# Patient Record
Sex: MALE | Race: WHITE | Employment: OTHER | URBAN - METROPOLITAN AREA
[De-identification: names, ages, dates, MRNs, and addresses within clinical notes are randomized per-mention and may not be internally consistent; named-entity substitution may affect disease eponyms.]

---

## 2017-08-22 ENCOUNTER — OFFICE VISIT (OUTPATIENT)
Dept: NEUROLOGY | Age: 77
End: 2017-08-22

## 2017-08-22 VITALS
HEART RATE: 81 BPM | WEIGHT: 192 LBS | SYSTOLIC BLOOD PRESSURE: 120 MMHG | OXYGEN SATURATION: 95 % | HEIGHT: 72 IN | DIASTOLIC BLOOD PRESSURE: 62 MMHG | BODY MASS INDEX: 26.01 KG/M2

## 2017-08-22 DIAGNOSIS — E56.0 VITAMIN E DEFICIENCY: ICD-10-CM

## 2017-08-22 DIAGNOSIS — M54.16 LUMBAR RADICULOPATHY: Primary | ICD-10-CM

## 2017-08-22 DIAGNOSIS — E11.42 DIABETIC POLYNEUROPATHY ASSOCIATED WITH TYPE 2 DIABETES MELLITUS (HCC): ICD-10-CM

## 2017-08-22 DIAGNOSIS — E55.9 VITAMIN D DEFICIENCY: ICD-10-CM

## 2017-08-22 DIAGNOSIS — E53.8 VITAMIN B12 DEFICIENCY: ICD-10-CM

## 2017-08-22 DIAGNOSIS — D47.2 MONOCLONAL GAMMOPATHY: ICD-10-CM

## 2017-08-22 DIAGNOSIS — R29.898 WEAKNESS OF RIGHT LEG: ICD-10-CM

## 2017-08-22 DIAGNOSIS — E53.8 B12 DEFICIENCY: ICD-10-CM

## 2017-08-22 RX ORDER — DESMOPRESSIN ACETATE 0.1 MG/ML
SOLUTION NASAL
Refills: 0 | COMMUNITY
Start: 2017-06-27 | End: 2022-07-12 | Stop reason: ALTCHOICE

## 2017-08-22 NOTE — PROGRESS NOTES
NEUROLOGY HISTORY AND PHYSICAL    Name Raisa Host Age 78 y.o. MRN [id number]  1940       Chief Complaint:  Buckling right leg   This is a 78 y.o. right leg is wobbly compared to the left. This started a few years ago worse in the last year. He may feel that it collapse at times. His knees buckle. It is not painful. He walks about 30 minutes a day. He has been orthopedics and he had x-rays but does not recall the results. He has been through for weeks of physical therapy and did not note improvement. He does not note numbness. Assessment and Plan   1. 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 B12 deficiency  Vitamin b12    3. Weakness of right leg  Hip flexor weakness    4. Diabetes II  Diet controlled    5. Hypertension  Continue valsartan    6. Diabetic neuropathy  EMG    6. Vitamin b12  deficiency      Patient Allergies    Review of patient's allergies indicates no known allergies. Current Outpatient Prescriptions   Medication Sig    desmopressin (DDAVP NASAL) 10 mcg/spray (0.1 mL) solution USE 1 SPRAY AS DIRECTED AT BEDTIME (NEED TO BE SEEN)    valsartan-hydrochlorothiazide (DIOVAN-HCT) 160-12.5 mg per tablet Take 1 tablet by mouth daily.  atorvastatin (LIPITOR) 40 mg tablet Take 40 mg by mouth daily. No current facility-administered medications for this visit. Past Medical History:   Diagnosis Date    Diabetes ([facility] [address]     Hypertension     Ill-defined condition     high cholesterol       Social History   Substance Use Topics    Smoking status: Never Smoker    Smokeless tobacco: Never Used    Alcohol use Yes      Comment: 1 etoh drink/day       Family History   Problem Relation Age of Onset    Mental Retardation Daughter      Review of Systems   Constitutional: Negative for chills and fever. HENT: Negative for ear pain. Eyes: Negative for pain and discharge. Respiratory: Negative for cough and hemoptysis.     Cardiovascular: Negative for chest pain and claudication. Gastrointestinal: Negative for constipation and diarrhea. Genitourinary: Negative for flank pain and hematuria. Musculoskeletal: Positive for back pain and myalgias. Skin: Negative for itching and rash. Neurological: Positive for sensory change and focal weakness. Negative for headaches. Endo/Heme/Allergies: Negative for environmental allergies. Does not bruise/bleed easily. Psychiatric/Behavioral: Negative for depression and hallucinations. Visit Vitals    /62    Pulse 81    Ht 6' (1.829 m)    Wt 192 lb (87.1 kg)    SpO2 95%    BMI 26.04 kg/m2      General: Well developed, well nourished. Patient in no apparent distress   Head: Normocephalic, atraumatic, anicteric sclera   Neck Normal ROM, No thyromegally   Lungs:  Clear to auscultation bilaterally, No wheezes or rubs   Cardiac: Regular rate and rhythm with no murmurs. Abd: Bowel sounds were audible. No tenderness on palpation   Ext: No pedal edema   Skin: No overt signs of rash or insect bites     NeurologicExam:  Mental Status: Alert and oriented to person place and time   Speech: Fluent no aphasia or dysarthria. Cranial Nerves:  II - XII Inact   Motor:  Right iliopsoas weakness. And symmetric weakness of small muscles of the feet. Reflexes:   +1/4 over the proximal tendons of the upper and lower extremities. +0/4 over distal tendons. Sensory:   Symmetric distal reduction in sensory perception affecting all modalities   Gait:  Wide based    Tremor:   No tremor noted. Cerebellar:  Coordination intact. Neurovascular: No carotid bruits.  No JVD       Imaging  MRI of the lumbar spine shows multilevel degenerative disc disease with bulging and mild to moderate bilateral foraminal compromise L3/4 and significant left L5     Lab Review  Lab Results   Component Value Date/Time    WBC 6.4 07/01/2009 12:25 PM    HCT 43.4 07/01/2009 12:25 PM    HGB 15.0 07/01/2009 12:25 PM    PLATELET 019 36/72/9816 12:25 PM Lab Results   Component Value Date/Time    Sodium 138 07/01/2009 12:25 PM    Potassium 3.8 07/01/2009 12:25 PM    Chloride 103 07/01/2009 12:25 PM    CO2 29 07/01/2009 12:25 PM    Glucose 99 07/01/2009 12:25 PM    BUN 15 07/01/2009 12:25 PM    Creatinine 1.0 07/01/2009 12:25 PM    Calcium 8.7 07/01/2009 12:25 PM       60  minutes spent more than 50% spent in face to face  examination and discussion of treatment options and approach

## 2017-08-22 NOTE — PATIENT INSTRUCTIONS
[facility]   Statement to Patients  April 1, 2014      In an effort to ensure the large volume of patient prescription refills is processed in the most efficient and expeditious manner, we are asking our patients to assist us by calling your Pharmacy for all prescription refills, this will include also your  Mail Order Pharmacy. The pharmacy will contact our office electronically to continue the refill process. Please do not wait until the last minute to call your pharmacy. We need at least 48 hours (2days) to fill prescriptions. We also encourage you to call your pharmacy before going to  your prescription to make sure it is ready. With regard to controlled substance prescription refill requests (narcotic refills) that need to be picked up at our office, we ask your cooperation by providing us with at least 72 hours (3days) notice that you will need a refill. We will not refill narcotic prescription refill requests after 4:00pm on any weekday, Monday through Thursday, or after 2:00pm on Fridays, or on the weekends. We encourage everyone to explore another way of getting your prescription refill request processed using [employer], our patient web portal through our electronic medical record system. [employer] is an efficient and effective way to communicate your medication request directly to the office and  downloadable as an marzena on your smart phone . [employer] also features a review functionality that allows you to view your medication list as well as leave messages for your physician. Are you ready to get connected? If so please review the attatched instructions or speak to any of our staff to get you set up right away! Thank you so much for your cooperation. Should you have any questions please contact our Practice Administrator.     The Physicians and Staff,  [facility]          A Healthy Lifestyle: Care Instructions  Your Care Instructions  A healthy lifestyle can help you feel good, stay at a healthy weight, and have plenty of energy for both work and play. A healthy lifestyle is something you can share with your whole family. A healthy lifestyle also can lower your risk for serious health problems, such as high blood pressure, heart disease, and diabetes. You can follow a few steps listed below to improve your health and the health of your family. Follow-up care is a key part of your treatment and safety. Be sure to make and go to all appointments, and call your doctor if you are having problems. Its also a good idea to know your test results and keep a list of the medicines you take. How can you care for yourself at home? · Do not eat too much sugar, fat, or fast foods. You can still have dessert and treats now and then. The goal is moderation. · Start small to improve your eating habits. Pay attention to portion sizes, drink less juice and soda pop, and eat more fruits and vegetables. ¨ Eat a healthy amount of food. A 3-ounce serving of meat, for example, is about the size of a deck of cards. Fill the rest of your plate with vegetables and whole grains. ¨ Limit the amount of soda and sports drinks you have every day. Drink more water when you are thirsty. ¨ Eat at least 5 servings of fruits and vegetables every day. It may seem like a lot, but it is not hard to reach this goal. A serving or helping is 1 piece of fruit, 1 cup of vegetables, or 2 cups of leafy, raw vegetables. Have an apple or some carrot sticks as an afternoon snack instead of a candy bar. Try to have fruits and/or vegetables at every meal.  · Make exercise part of your daily routine. You may want to start with simple activities, such as walking, bicycling, or slow swimming. Try to be active 30 to 60 minutes every day. You do not need to do all 30 to 60 minutes all at once. For example, you can exercise 3 times a day for 10 or 20 minutes.  Moderate exercise is safe for most people, but it is always a good idea to talk to your doctor before starting an exercise program.  · Keep moving. Nancy Gutierrez the lawn, work in the garden, or [employer]. Take the stairs instead of the elevator at work. · If you smoke, quit. People who smoke have an increased risk for heart attack, stroke, cancer, and other lung illnesses. Quitting is hard, but there are ways to boost your chance of quitting tobacco for good. ¨ Use nicotine gum, patches, or lozenges. ¨ Ask your doctor about stop-smoking programs and medicines. ¨ Keep trying. In addition to reducing your risk of diseases in the future, you will notice some benefits soon after you stop using tobacco. If you have shortness of breath or asthma symptoms, they will likely get better within a few weeks after you quit. · Limit how much alcohol you drink. Moderate amounts of alcohol (up to 2 drinks a day for men, 1 drink a day for women) are okay. But drinking too much can lead to liver problems, high blood pressure, and other health problems. Family health  If you have a family, there are many things you can do together to improve your health. · Eat meals together as a family as often as possible. · Eat healthy foods. This includes fruits, vegetables, lean meats and dairy, and whole grains. · Include your family in your fitness plan. Most people think of activities such as jogging or tennis as the way to fitness, but there are many ways you and your family can be more active. Anything that makes you breathe hard and gets your heart pumping is exercise. Here are some tips:  ¨ Walk to do errands or to take your child to school or the bus. ¨ Go for a family bike ride after dinner instead of watching TV. Where can you learn more? Go to http://hilario-toy.info/. Enter N631 in the search box to learn more about \"A Healthy Lifestyle: Care Instructions. \"  Current as of: July 26, 2016  Content Version: 11.3  © 4350-5856 [employer]. Care instructions adapted under license by [employer] (which disclaims liability or warranty for this information). If you have questions about a medical condition or this instruction, always ask your healthcare professional. [address] any warranty or liability for your use of this information.

## 2017-08-22 NOTE — MR AVS SNAPSHOT
Visit Information Date & Time Provider Department Dept. Phone Encounter #  
 8/22/2017  9:00 AM Odilia Khan MD Neurology Clinic at [facility] [phone] [id number] Your Appointments 9/14/2017  3:00 PM  
PROCEDURE with Osvaldo Salter MD  
Neurology Clinic at [facility]) Appt Note: emg lower extremity,lsw,08/22/2017,  
 [address], 
[address] [place] [address], [address] [address]  
[address], [address] [facility], [address] [address] Upcoming Health Maintenance Date Due DTaP/Tdap/Td series (1 - Tdap) 11/4/1963 FOBT Q 1 YEAR AGE 50-75 11/4/1992 ZOSTER VACCINE AGE 60> 9/4/2002 GLAUCOMA SCREENING Q2Y 11/4/2007 Pneumococcal 65+ Low/Medium Risk (1 of 2 - PCV13) 11/4/2007 MEDICARE YEARLY EXAM 11/4/2007 INFLUENZA AGE 9 TO ADULT 8/1/2017 Allergies as of 8/22/2017  Review Complete On: 8/22/2017 By: Clarissa Roland No Known Allergies Current Immunizations  Never Reviewed No immunizations on file. Not reviewed this visit You Were Diagnosed With   
  
 Codes Comments Lumbar radiculopathy    -  Primary ICD-10-CM: M54.16 
ICD-9-CM: 724.4 B12 deficiency     ICD-10-CM: E53.8 ICD-9-CM: 266.2 Weakness of right leg     ICD-10-CM: R29.898 ICD-9-CM: 729.89 Diabetic polyneuropathy associated with type 2 diabetes mellitus [facility]     ICD-10-CM: E11.42 
ICD-9-CM: 250.60, 357.2 Vitamin E deficiency     ICD-10-CM: E56.0 ICD-9-CM: 269.1 Vitamin B12 deficiency     ICD-10-CM: E53.8 ICD-9-CM: 266.2 Vitamin D deficiency     ICD-10-CM: E55.9 ICD-9-CM: 268.9 Monoclonal gammopathy     ICD-10-CM: D47.2 ICD-9-CM: 273.1 Vitals BP Pulse Height(growth percentile) Weight(growth percentile) SpO2 BMI  
 120/62 81 6' (1.829 m) 192 lb (87.1 kg) 95% 26.04 kg/m2 Smoking Status Never Smoker Vitals History BMI and BSA Data Body Mass Index Body Surface Area 26.04 kg/m 2 2.1 m 2 Preferred Pharmacy Pharmacy Name Phone [id number], [address] [phone] Your Updated Medication List  
  
   
This list is accurate as of: 8/22/17  9:39 AM.  Always use your most recent med list.  
  
  
  
  
 atorvastatin 40 mg tablet Commonly known as:  LIPITOR Take 40 mg by mouth daily. desmopressin 10 mcg/spray (0.1 mL) solution Commonly known as:  DDAVP NASAL  
USE 1 SPRAY AS DIRECTED AT BEDTIME (NEED TO BE SEEN)  
  
 valsartan-hydroCHLOROthiazide 160-12.5 mg per tablet Commonly known as:  DIOVAN-HCT Take 1 tablet by mouth daily. We Performed the Following SERUM PROT ELECTROPHORESIS IFX [[id number] Custom] VITAMIN B12 & FOLATE [46368 CPT(R)] VITAMIN B12 & FOLATE [29552 CPT(R)] VITAMIN B6 [id number] CPT(R)] VITAMIN D, 25 HYDROXY [id number] CPT(R)] VITAMIN E [id number] CPT(R)] To-Do List   
 08/22/2017 Neurology:  NCV WITH EMG BILATERAL LOWER EXTREMITIES Patient Instructions PRESCRIPTION REFILL POLICY [facility] Statement to Patients April 1, 2014 In an effort to ensure the large volume of patient prescription refills is processed in the most efficient and expeditious manner, we are asking our patients to assist us by calling your Pharmacy for all prescription refills, this will include also your  Mail Order Pharmacy. The pharmacy will contact our office electronically to continue the refill process. Please do not wait until the last minute to call your pharmacy. We need at least 48 hours (2days) to fill prescriptions. We also encourage you to call your pharmacy before going to  your prescription to make sure it is ready.   
 
With regard to controlled substance prescription refill requests (narcotic refills) that need to be picked up at our office, we ask your cooperation by providing us with at least 72 hours (3days) notice that you will need a refill. We will not refill narcotic prescription refill requests after 4:00pm on any weekday, Monday through Thursday, or after 2:00pm on Fridays, or on the weekends. We encourage everyone to explore another way of getting your prescription refill request processed using [employer], our patient web portal through our electronic medical record system. [employer] is an efficient and effective way to communicate your medication request directly to the office and  downloadable as an marzena on your smart phone . [employer] also features a review functionality that allows you to view your medication list as well as leave messages for your physician. Are you ready to get connected? If so please review the attatched instructions or speak to any of our staff to get you set up right away! Thank you so much for your cooperation. Should you have any questions please contact our Practice Administrator. The Physicians and Staff,  [facility] A Healthy Lifestyle: Care Instructions Your Care Instructions A healthy lifestyle can help you feel good, stay at a healthy weight, and have plenty of energy for both work and play. A healthy lifestyle is something you can share with your whole family. A healthy lifestyle also can lower your risk for serious health problems, such as high blood pressure, heart disease, and diabetes. You can follow a few steps listed below to improve your health and the health of your family. Follow-up care is a key part of your treatment and safety. Be sure to make and go to all appointments, and call your doctor if you are having problems. Its also a good idea to know your test results and keep a list of the medicines you take. How can you care for yourself at home? · Do not eat too much sugar, fat, or fast foods. You can still have dessert and treats now and then. The goal is moderation. · Start small to improve your eating habits. Pay attention to portion sizes, drink less juice and soda pop, and eat more fruits and vegetables. ¨ Eat a healthy amount of food. A 3-ounce serving of meat, for example, is about the size of a deck of cards. Fill the rest of your plate with vegetables and whole grains. ¨ Limit the amount of soda and sports drinks you have every day. Drink more water when you are thirsty. ¨ Eat at least 5 servings of fruits and vegetables every day. It may seem like a lot, but it is not hard to reach this goal. A serving or helping is 1 piece of fruit, 1 cup of vegetables, or 2 cups of leafy, raw vegetables. Have an apple or some carrot sticks as an afternoon snack instead of a candy bar. Try to have fruits and/or vegetables at every meal. 
· Make exercise part of your daily routine. You may want to start with simple activities, such as walking, bicycling, or slow swimming. Try to be active 30 to 60 minutes every day. You do not need to do all 30 to 60 minutes all at once. For example, you can exercise 3 times a day for 10 or 20 minutes. Moderate exercise is safe for most people, but it is always a good idea to talk to your doctor before starting an exercise program. 
· Keep moving. Loretta Pour the lawn, work in the garden, or [employer]. Take the stairs instead of the elevator at work. · If you smoke, quit. People who smoke have an increased risk for heart attack, stroke, cancer, and other lung illnesses. Quitting is hard, but there are ways to boost your chance of quitting tobacco for good. ¨ Use nicotine gum, patches, or lozenges. ¨ Ask your doctor about stop-smoking programs and medicines. ¨ Keep trying.  
In addition to reducing your risk of diseases in the future, you will notice some benefits soon after you stop using tobacco. If you have shortness of breath or asthma symptoms, they will likely get better within a few weeks after you quit. · Limit how much alcohol you drink. Moderate amounts of alcohol (up to 2 drinks a day for men, 1 drink a day for women) are okay. But drinking too much can lead to liver problems, high blood pressure, and other health problems. Family health If you have a family, there are many things you can do together to improve your health. · Eat meals together as a family as often as possible. · Eat healthy foods. This includes fruits, vegetables, lean meats and dairy, and whole grains. · Include your family in your fitness plan. Most people think of activities such as jogging or tennis as the way to fitness, but there are many ways you and your family can be more active. Anything that makes you breathe hard and gets your heart pumping is exercise. Here are some tips: 
¨ Walk to do errands or to take your child to school or the bus. ¨ Go for a family bike ride after dinner instead of watching TV. Where can you learn more? Go to http://hilario-toy.info/. Enter Z021 in the search box to learn more about \"A Healthy Lifestyle: Care Instructions. \" Current as of: July 26, 2016 Content Version: 11.3 © 7730-1234 [employer]. Care instructions adapted under license by [employer] (which disclaims liability or warranty for this information). If you have questions about a medical condition or this instruction, always ask your healthcare professional. [place] [address] any warranty or liability for your use of this information. Introducing [facility]! [facility] introduces Marco Vasco patient portal. Now you can access parts of your medical record, email your doctor's office, and request medication refills online. 1. In your internet browser, go to [email or web address] 2. Click on the First Time User? Click Here link in the Sign In box. You will see the New Member Sign Up page. 3. Enter your [employer] Access Code exactly as it appears below. You will not need to use this code after youve completed the sign-up process. If you do not sign up before the expiration date, you must request a new code. · [employer] Access Code: [email or web address] Expires: 11/20/2017  8:31 AM 
 
4. Enter the last four digits of your Social Security Number (xxxx) and Date of Birth (mm/dd/yyyy) as indicated and click Submit. You will be taken to the next sign-up page. 5. Create a [employer] ID. This will be your [employer] login ID and cannot be changed, so think of one that is secure and easy to remember. 6. Create a [employer] password. You can change your password at any time. 7. Enter your Password Reset Question and Answer. This can be used at a later time if you forget your password. 8. Enter your e-mail address. You will receive e-mail notification when new information is available in [address] 9. Click Sign Up. You can now view and download portions of your medical record. 10. Click the Download Summary menu link to download a portable copy of your medical information. If you have questions, please visit the Frequently Asked Questions section of the [employer] website. Remember, [employer] is NOT to be used for urgent needs. For medical emergencies, dial 911. Now available from your iPhone and Android! Please provide this summary of care documentation to your next provider. Your primary care clinician is listed as NOT ON FILE. If you have any questions after today's visit, please call [phone].

## 2017-09-14 ENCOUNTER — OFFICE VISIT (OUTPATIENT)
Dept: NEUROLOGY | Age: 77
End: 2017-09-14

## 2017-09-14 ENCOUNTER — TELEPHONE (OUTPATIENT)
Dept: NEUROLOGY | Age: 77
End: 2017-09-14

## 2017-09-14 VITALS — DIASTOLIC BLOOD PRESSURE: 67 MMHG | OXYGEN SATURATION: 100 % | HEART RATE: 100 BPM | SYSTOLIC BLOOD PRESSURE: 101 MMHG

## 2017-09-14 DIAGNOSIS — E11.42 DIABETIC POLYNEUROPATHY ASSOCIATED WITH TYPE 2 DIABETES MELLITUS (HCC): Primary | ICD-10-CM

## 2017-09-14 DIAGNOSIS — G56.00 CARPAL TUNNEL SYNDROME, UNSPECIFIED LATERALITY: ICD-10-CM

## 2017-09-14 NOTE — PROCEDURES
[facility] at [address] [facility] [address] [facility], [address] [facility]  Tel [phone]     Fax [phone]  Electrodiagnostic Study Report  Test Date:  2017    Patient: Tevin Solorzano  Age: 78   [facility]#: [id number]  Ref Phys: Emily Abreu   Sex: Male  Physician: Yamilex Schuster MD   Height: ' \"      : 1940  DOS: 2017       CHIEF COMPLAINTS:  Patient is a 78year-old male who presents with right leg weakness query right lumbar radiculopathy and peripheral neuropathy.     EMG & NCV Findings:    Nerve Conduction Studies  Anti Sensory Summary Table     Site NR Peak (ms) P-T Amp (µV) Site1 Site2 Dist (cm)   Right Radial Anti Sensory (Base 1st Digit)  33.8°C   Wrist    2.5 16.4 Wrist Base 1st Digit 10.0   Left Sup Fibular Anti Sensory (Lat ankle)  31.7°C   Lower leg    3.2 2.9 Lower leg Lat ankle 10.0   Right Sup Fibular Anti Sensory (Lat ankle)  31.5°C   Lower leg    3.5 4.1 Lower leg Lat ankle 10.0   Left Sural Anti Sensory (Lat Mall)  30.8°C   Calf    4.3 3.1 Calf Lat Mall 14.0   Site 2    4.3 3.9      Right Sural Anti Sensory (Lat Mall)  31.8°C   Calf    3.9 4.9 Calf Lat Mall 14.0   Site 2    3.9 7.9      Site 3    3.5 4.7        Motor Summary Table     Site NR Onset (ms) O-P Amp (mV) P-T Amp (mV) Site1 Site2 Dist (cm) Francisco J (m/s)   Left Fibular Motor (Ext Dig Brev)  31.4°C   Ankle    5.6 0.2 0.3 Ankle Ext Dig Brev 8.0    B Fib    14.5 0.2 0.1 B Fib Ankle 32.5 37   Poplt    17.1 0.2 0.1 Poplt B Fib 10.0 38   Right Fibular Motor (Ext Dig Brev)  31.8°C   Ankle    5.4 1.9 3.3 Ankle Ext Dig Brev 8.0    B Fib    14.0 1.8 2.7 B Fib Ankle 33.0 38   Poplt    16.4 1.9 2.7 Poplt B Fib 10.0 42   Left Median Motor (Abd Poll Brev)  33.9°C   Wrist    4.2 6.5 10.1 Wrist Abd Poll Brev 8.0    Elbow    9.5 6.4 9.6 Elbow Wrist 24.0 45   Right Median Motor (Abd Poll Brev)  33.8°C   Wrist    4.8 6.0 8.4 Wrist Abd Poll Brev 8.0    Elbow    10.0 5.1 7.1 Elbow Wrist 24.5 47 Left Tibial Motor (Abd Villagomez Brev)  31.7°C   Ankle    4.9 1.5 2.4 Ankle Abd Villagomez Brev 8.0    Knee    17.0 1.7 2.7 Knee Ankle 45.0 37   Right Tibial Motor (Abd Villagomez Brev)  31.9°C   Ankle    5.2 1.8 3.0 Ankle Abd Villagomez Brev 8.0    Knee    16.6 1.6 2.5 Knee Ankle 44.5 39   Right Ulnar Motor (Abd Dig Minimi)  33.9°C   Wrist    3.0 9.9 15.8 Wrist Abd Dig Minimi 8.0    B Elbow    7.0 8.5 14.1 B Elbow Wrist 20.5 51   A Elbow    8.9 9.2 14.9 A Elbow B Elbow 10.0 53     Comparison Summary Table     Site NR Peak (ms) P-T Amp (µV) Site1 Site2 Dist (cm) Delta-0 (ms)   Left Median/Ulnar Palm Comparison (Wrist)  33.6°C   Median Palm    2.4 52.2 Median Palm Ulnar Palm 8.0 0.2   Ulnar Palm    2.2 11.7       Right Median/Ulnar Palm Comparison (Wrist)  34.2°C   Median Palm    2.9 45.0 Median Palm Ulnar Palm 8.0 0.8   Ulnar Palm    2.0 15.7         F Wave Studies     NR F-Lat (ms) L-R F-Lat (ms)   Right Median (Mrkrs) (Abd Poll Brev)  34.3°C      31.86    Right Peroneal (Mrkrs) (EDB)  32°C      58.05    Right Tibial (Mrkrs) (Abd Hallucis)  31.8°C      57.77      EMG     Side Muscle Nerve Root Ins Act Fibs Psw Recrt Duration Amp Poly Comment   Right AntTibialis Dp Br Peron L4-5 Nml Nml Nml Nml Nml Nml Nml    Right MedGastroc Tibial S1-2 Nml Nml Nml Nml Nml Nml Nml    Right VastusLat Femoral L2-4 Nml Nml Nml Nml Nml Nml Nml    Right BicepsFemS Sciatic L5-S1 Nml Nml Nml Nml Nml Nml Nml    Right TensorFascLat SupGluteal L4-5, S1 Nml Nml Nml Nml Nml Nml Nml    Left AntTibialis Dp Br Peron L4-5 Nml Nml Nml Nml Nml Nml Nml    Left MedGastroc Tibial S1-2 Nml Nml Nml Nml Nml Nml Nml    Left VastusLat Femoral L2-4 Nml Nml Nml Nml Nml Nml Nml    Right 1stDorInt Ulnar C8-T1 Nml Nml Nml Nml Nml Nml Nml    Right Biceps Musculocut C5-6 Nml Nml Nml Nml Nml Nml Nml    Right PronatorTeres Median C6-7 Nml Nml Nml Nml Nml Nml Nml    Right Triceps Radial C6-7-8 Nml Nml Nml Nml Nml Nml Nml    Right Deltoid Axillary C5-6 Nml Nml Nml Nml Nml Nml Nml    Right Lower Lumb Parasp Rami L5,S1 Nml Nml Nml Nml Nml Nml Nml        Waveforms:                                           Summary:  Nerve conduction studies of the bilateral lower and bilateral upper extremities were remarkable for low sural to radial sensory amplitude ratio. The bilateral peroneal amplitudes were low. All the motor conduction velocities in the upper and lower extremities were slow. The left peroneal motor amplitude was low in comparison to the right. The bilateral tibial motor amplitudes were low as well. The right median latency was prolonged in comparison to the ulnar latency on palmar comparison studies. Needle electrode examination of the bilateral lower in the right upper extremities was unremarkable. Impression: This is an abnormal study. There is electrophysiological evidence of:  1. A length dependent axonal polyneuropathy; and  2. 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    There was no electrophysiological evidence of a right cervical, right lumbar or a left lumbar radiculopathy.             __________________  Lam Gunderson M.D.

## 2017-09-14 NOTE — TELEPHONE ENCOUNTER
Patient came into the office stating he received a bill from [facility] for his vitamin b6 lab. He states it may be a coding issue and would like to see if it can be resubmitted.  Please advise   [phone]

## 2017-09-14 NOTE — LETTER
[facility] at [address] [place] [address] [address] [place], [address] [facility] Tel [phone]     Fax [phone] Electrodiagnostic Study Report Test Date:  2017 Patient: Archie Moran  Age: 78 [facility]#: [id number]  Ref Phys: Emma Villanueva Sex: Male  Physician: Kalli Rios MD  
Height: ' \"     
: 1940  DOS: 2017 CHIEF COMPLAINTS: 
Patient is a 78year-old male who presents with right leg weakness query right lumbar radiculopathy and peripheral neuropathy. EMG & NCV Findings: 
 
Nerve Conduction Studies Anti Sensory Summary Table Site NR Peak (ms) P-T Amp (µV) Site1 Site2 Dist (cm) Right Radial Anti Sensory (Base 1st Digit)  33.8°C Wrist    2.5 16.4 Wrist Base 1st Digit 10.0 Left Sup Fibular Anti Sensory (Lat ankle)  31.7°C Lower leg    3.2 2.9 Lower leg Lat ankle 10.0 Right Sup Fibular Anti Sensory (Lat ankle)  31.5°C Lower leg    3.5 4.1 Lower leg Lat ankle 10.0 Left Sural Anti Sensory (Lat Mall)  30.8°C Calf    4.3 3.1 Calf Lat Mall 14.0 Site 2    4.3 3.9 Right Sural Anti Sensory (Lat Mall)  31.8°C Calf    3.9 4.9 Calf Lat Mall 14.0 Site 2    3.9 7.9 Site 3    3.5 4.7 Motor Summary Table Site NR Onset (ms) O-P Amp (mV) P-T Amp (mV) Site1 Site2 Dist (cm) Francisco J (m/s) Left Fibular Motor (Ext Dig Brev)  31.4°C Ankle    5.6 0.2 0.3 Ankle Ext Dig Brev 8.0 B Fib    14.5 0.2 0.1 B Fib Ankle 32.5 37 Poplt    17.1 0.2 0.1 Poplt B Fib 10.0 38 Right Fibular Motor (Ext Dig Brev)  31.8°C Ankle    5.4 1.9 3.3 Ankle Ext Dig Brev 8.0 B Fib    14.0 1.8 2.7 B Fib Ankle 33.0 38 Poplt    16.4 1.9 2.7 Poplt B Fib 10.0 42 Left Median Motor (Abd Poll Brev)  33.9°C Wrist    4.2 6.5 10.1 Wrist Abd Poll Brev 8.0 Elbow    9.5 6.4 9.6 Elbow Wrist 24.0 45 Right Median Motor (Abd Poll Brev)  33.8°C Wrist    4.8 6.0 8.4 Wrist Abd Poll Brev 8.0 Elbow    10.0 5.1 7.1 Elbow Wrist 24.5 47 Left Tibial Motor (Abd Villagomez Brev)  31.7°C Ankle    4.9 1.5 2.4 Ankle Abd Villagomez Brev 8.0 Knee    17.0 1.7 2.7 Knee Ankle 45.0 37 Right Tibial Motor (Abd Villagomez Brev)  31.9°C Ankle    5.2 1.8 3.0 Ankle Abd Villagomez Brev 8.0 Knee    16.6 1.6 2.5 Knee Ankle 44.5 39 Right Ulnar Motor (Abd Dig Minimi)  33.9°C Wrist    3.0 9.9 15.8 Wrist Abd Dig Minimi 8.0 B Elbow    7.0 8.5 14.1 B Elbow Wrist 20.5 51 A Elbow    8.9 9.2 14.9 A Elbow B Elbow 10.0 53 Comparison Summary Table Site NR Peak (ms) P-T Amp (µV) Site1 Site2 Dist (cm) Delta-0 (ms) Left Median/Ulnar Palm Comparison (Wrist)  33.6°C Median Palm    2.4 52.2 Median Palm Ulnar Palm 8.0 0.2 Ulnar Palm    2.2 11.7 Right Median/Ulnar Palm Comparison (Wrist)  34.2°C Median Palm    2.9 45.0 Median Palm Ulnar Palm 8.0 0.8 Ulnar Palm    2.0 15.7 F Wave Studies NR F-Lat (ms) L-R F-Lat (ms) Right Median (Mrkrs) (Abd [employer] [place] Brev)  34.3°C  
   31.86 Right Peroneal (Mrkrs) (EDB)  32°C  
   58.05 Right Tibial (Mrkrs) (Abd Hallucis)  31.8°C  
   57.77 EMG Side Muscle Nerve Root Ins Act Fibs Psw Recrt Duration Amp Poly Comment Right AntTibialis Dp Br Peron L4-5 Nml Nml Nml Nml Nml Nml Nml Right MedGastroc Tibial S1-2 Nml Nml Nml Nml Nml Nml Nml Right VastusLat Femoral L2-4 Nml Nml Nml Nml Nml Nml Nml Right BicepsFemS Sciatic L5-S1 Nml Nml Nml Nml Nml Nml Nml Right TensorFascLat SupGluteal L4-5, S1 Nml Nml Nml Nml Nml Nml Nml Left AntTibialis Dp Br Peron L4-5 Nml Nml Nml Nml Nml Nml Nml Left MedGastroc Tibial S1-2 Nml Nml Nml Nml Nml Nml Nml Left VastusLat Femoral L2-4 Nml Nml Nml Nml Nml Nml Nml Right 1stDorInt Ulnar C8-T1 Nml Nml Nml Nml Nml Nml Nml Right Biceps Musculocut C5-6 Nml Nml Nml Nml Nml Nml Nml Right PronatorTeres Median C6-7 Nml Nml Nml Nml Nml Nml Nml Right Triceps Radial C6-7-8 Nml Nml Nml Nml Nml Nml Nml Right Deltoid Axillary C5-6 Nml Nml Nml Nml Nml Nml Nml   
 Right Lower Lumb Parasp Rami L5,S1 Nml Nml Nml Nml Nml Nml Nml Waveforms: 
    
 
    
 
    
 
    
 
    
 
   
 
Summary: 
Nerve conduction studies of the bilateral lower and bilateral upper extremities were remarkable for low sural to radial sensory amplitude ratio. The bilateral peroneal amplitudes were low. All the motor conduction velocities in the upper and lower extremities were slow. The left peroneal motor amplitude was low in comparison to the right. The bilateral tibial motor amplitudes were low as well. The right median latency was prolonged in comparison to the ulnar latency on palmar comparison studies. Needle electrode examination of the bilateral lower in the right upper extremities was unremarkable. Impression: This is an abnormal study. There is electrophysiological evidence of: 1. A length dependent axonal polyneuropathy; and 
2. 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 
 
There was no electrophysiological evidence of a right cervical, right lumbar or a left lumbar radiculopathy. __________________ Da Patrick M.D.

## 2017-09-15 LAB
25(OH)D3+25(OH)D2 SERPL-MCNC: 28.5 NG/ML (ref 30–100)
A-TOCOPHEROL VIT E SERPL-MCNC: 10.7 MG/L (ref 5.3–17.5)
FOLATE SERPL-MCNC: 13.4 NG/ML
VIT B12 SERPL-MCNC: 278 PG/ML (ref 211–946)
VIT B6 SERPL-MCNC: 10.2 UG/L (ref 5.3–46.7)

## 2017-09-15 NOTE — TELEPHONE ENCOUNTER
Patient brought copy of medicare beneficiary notice for hi lab fred coverage   He received a Vitamin B6 lab and letter stated medicare does not  pay for this test.   Nurse called [facility] and gave them  Dx. codes for test for order. Patient was then called and asked to call medicare and find put why this was not paid.

## 2017-09-19 DIAGNOSIS — E55.9 VITAMIN D DEFICIENCY: ICD-10-CM

## 2017-09-19 DIAGNOSIS — E53.8 VITAMIN B12 DEFICIENCY: Primary | ICD-10-CM

## 2017-09-19 RX ORDER — CYANOCOBALAMIN 1000 UG/ML
INJECTION, SOLUTION INTRAMUSCULAR; SUBCUTANEOUS
Qty: 8 VIAL | Refills: 0 | Status: SHIPPED | OUTPATIENT
Start: 2017-09-19 | End: 2021-09-24

## 2017-10-12 ENCOUNTER — OFFICE VISIT (OUTPATIENT)
Dept: NEUROLOGY | Age: 77
End: 2017-10-12

## 2017-10-12 VITALS
OXYGEN SATURATION: 96 % | BODY MASS INDEX: 26.01 KG/M2 | DIASTOLIC BLOOD PRESSURE: 62 MMHG | WEIGHT: 192 LBS | SYSTOLIC BLOOD PRESSURE: 110 MMHG | HEIGHT: 72 IN | HEART RATE: 89 BPM

## 2017-10-12 DIAGNOSIS — R26.81 GAIT INSTABILITY: ICD-10-CM

## 2017-10-12 DIAGNOSIS — E55.9 VITAMIN D DEFICIENCY: ICD-10-CM

## 2017-10-12 DIAGNOSIS — R41.3 MEMORY LOSS: ICD-10-CM

## 2017-10-12 DIAGNOSIS — G91.2 NORMAL PRESSURE HYDROCEPHALUS (HCC): Primary | ICD-10-CM

## 2017-10-12 DIAGNOSIS — E53.8 B12 DEFICIENCY: ICD-10-CM

## 2017-10-12 NOTE — PROGRESS NOTES
Name: Amy Fatima    Chief Complaint:     Returns for follow up visit. He has had his EMG which points towards an axonopathy. He feel he has had modest improvement since starting the B12. He continues to have trouble walking. He also has episodes of urinary incontinence and forgetfullness. .    Assesment and Plan  1. Normal pressure hydrocephalus  MRI of the brain without contrast    2. B12 deficiency  continue vitamin b    3. Vitamin D deficiency  Continue vitamin D    4. Gait instability  May be secondary to NPH    5. Memory loss  May be secondary to 1      Allergies  Review of patient's allergies indicates no known allergies. Medications  Current Outpatient Prescriptions   Medication Sig    cyanocobalamin (VITAMIN B-12) 1,000 mcg/mL injection Intramuscular injection of 1000 mcg every week for 4 weeks then every month  Indications: VITAMIN B12 DEFICIENCY    Syringe-Needle, Safety,Disp Un 3 mL 25 gauge x 1\" syrg Use as directed    cholecalciferol, vitamin D3, (VITAMIN D3) 4,000 unit cap Take 1 Cap by mouth daily. Indications: VITAMIN D DEFICIENCY    desmopressin (DDAVP NASAL) 10 mcg/spray (0.1 mL) solution USE 1 SPRAY AS DIRECTED AT BEDTIME (NEED TO BE SEEN)    valsartan-hydrochlorothiazide (DIOVAN-HCT) 160-12.5 mg per tablet Take 1 tablet by mouth daily.  atorvastatin (LIPITOR) 40 mg tablet Take 40 mg by mouth daily. No current facility-administered medications for this visit. Medical History  Past Medical History:   Diagnosis Date    Diabetes ([facility] [address]     Hypertension     Ill-defined condition     high cholesterol     Review of Systems   Constitutional: Negative for chills and fever. HENT: Negative for ear pain. Eyes: Negative for pain and discharge. Respiratory: Negative for cough and hemoptysis. Cardiovascular: Negative for chest pain and claudication. Gastrointestinal: Negative for constipation and diarrhea. Genitourinary: Positive for urgency.  Negative for flank pain and hematuria. Musculoskeletal: Positive for myalgias. Negative for back pain. Skin: Negative for itching and rash. Neurological: Negative for sensory change and headaches. Endo/Heme/Allergies: Negative for environmental allergies. Does not bruise/bleed easily. Psychiatric/Behavioral: Negative for depression and hallucinations. Exam:    Visit Vitals    /62    Pulse 89    Ht 6' (1.829 m)    Wt 192 lb (87.1 kg)    SpO2 96%    BMI 26.04 kg/m2         General: Well developed, well nourished. Patient in no apparent distress   Head: Normocephalic, atraumatic, anicteric sclera   Neck Normal ROM, No thyromegally   Lungs:  Clear to auscultation bilaterally, No wheezes or rubs   Cardiac: Regular rate and rhythm with no murmurs. Abd: Bowel sounds were audible. No tenderness on palpation   Ext: No pedal edema   Skin: No overt signs of rash or insect bites      NeurologicExam:  Mental Status: Alert and oriented to person place and time   Speech: Fluent no aphasia or dysarthria. Cranial Nerves:  II - XII Inact   Motor:  Right iliopsoas weakness. And symmetric weakness of small muscles of the feet. Reflexes:   +1/4 over the proximal tendons of the upper and lower extremities. +0/4 over distal tendons. Sensory:   Symmetric distal reduction in sensory perception affecting all modalities   Gait:  Wide based    Tremor:   No tremor noted. Cerebellar:  Coordination intact. Neurovascular: No carotid bruits.  No JVD             Imaging    Lab Review    Lab Results   Component Value Date/Time    WBC 6.4 07/01/2009 12:25 PM    HCT 43.4 07/01/2009 12:25 PM    HGB 15.0 07/01/2009 12:25 PM    PLATELET 838 72/24/4423 12:25 PM       Lab Results   Component Value Date/Time    Sodium 138 07/01/2009 12:25 PM    Potassium 3.8 07/01/2009 12:25 PM    Chloride 103 07/01/2009 12:25 PM    CO2 29 07/01/2009 12:25 PM    Glucose 99 07/01/2009 12:25 PM    BUN 15 07/01/2009 12:25 PM    Creatinine 1.0 07/01/2009 12:25 PM    Calcium 8.7 07/01/2009 12:25 PM         Lab Results   Component Value Date/Time    Vitamin B12 278 09/12/2017 01:11 PM    Folate 13.4 09/12/2017 01:11 PM

## 2017-10-12 NOTE — PATIENT INSTRUCTIONS
[address] [facility]   Statement to Patients  April 1, 2014      In an effort to ensure the large volume of patient prescription refills is processed in the most efficient and expeditious manner, we are asking our patients to assist us by calling your Pharmacy for all prescription refills, this will include also your  Mail Order Pharmacy. The pharmacy will contact our office electronically to continue the refill process. Please do not wait until the last minute to call your pharmacy. We need at least 48 hours (2days) to fill prescriptions. We also encourage you to call your pharmacy before going to  your prescription to make sure it is ready. With regard to controlled substance prescription refill requests (narcotic refills) that need to be picked up at our office, we ask your cooperation by providing us with at least 72 hours (3days) notice that you will need a refill. We will not refill narcotic prescription refill requests after 4:00pm on any weekday, Monday through Thursday, or after 2:00pm on Fridays, or on the weekends. We encourage everyone to explore another way of getting your prescription refill request processed using [employer], our patient web portal through our electronic medical record system. [employer] is an efficient and effective way to communicate your medication request directly to the office and  downloadable as an marzena on your smart phone . [employer] also features a review functionality that allows you to view your medication list as well as leave messages for your physician. Are you ready to get connected? If so please review the attatched instructions or speak to any of our staff to get you set up right away! Thank you so much for your cooperation. Should you have any questions please contact our Practice Administrator.     The Physicians and Staff,  [facility]          A Healthy Lifestyle: Care Instructions  Your Care Instructions  A healthy lifestyle can help you feel good, stay at a healthy weight, and have plenty of energy for both work and play. A healthy lifestyle is something you can share with your whole family. A healthy lifestyle also can lower your risk for serious health problems, such as high blood pressure, heart disease, and diabetes. You can follow a few steps listed below to improve your health and the health of your family. Follow-up care is a key part of your treatment and safety. Be sure to make and go to all appointments, and call your doctor if you are having problems. Its also a good idea to know your test results and keep a list of the medicines you take. How can you care for yourself at home? · Do not eat too much sugar, fat, or fast foods. You can still have dessert and treats now and then. The goal is moderation. · Start small to improve your eating habits. Pay attention to portion sizes, drink less juice and soda pop, and eat more fruits and vegetables. ¨ Eat a healthy amount of food. A 3-ounce serving of meat, for example, is about the size of a deck of cards. Fill the rest of your plate with vegetables and whole grains. ¨ Limit the amount of soda and sports drinks you have every day. Drink more water when you are thirsty. ¨ Eat at least 5 servings of fruits and vegetables every day. It may seem like a lot, but it is not hard to reach this goal. A serving or helping is 1 piece of fruit, 1 cup of vegetables, or 2 cups of leafy, raw vegetables. Have an apple or some carrot sticks as an afternoon snack instead of a candy bar. Try to have fruits and/or vegetables at every meal.  · Make exercise part of your daily routine. You may want to start with simple activities, such as walking, bicycling, or slow swimming. Try to be active 30 to 60 minutes every day. You do not need to do all 30 to 60 minutes all at once. For example, you can exercise 3 times a day for 10 or 20 minutes.  Moderate exercise is safe for most people, but it is always a good idea to talk to your doctor before starting an exercise program.  · Keep moving. Jessica Mosquera the lawn, work in the garden, or [employer]. Take the stairs instead of the elevator at work. · If you smoke, quit. People who smoke have an increased risk for heart attack, stroke, cancer, and other lung illnesses. Quitting is hard, but there are ways to boost your chance of quitting tobacco for good. ¨ Use nicotine gum, patches, or lozenges. ¨ Ask your doctor about stop-smoking programs and medicines. ¨ Keep trying. In addition to reducing your risk of diseases in the future, you will notice some benefits soon after you stop using tobacco. If you have shortness of breath or asthma symptoms, they will likely get better within a few weeks after you quit. · Limit how much alcohol you drink. Moderate amounts of alcohol (up to 2 drinks a day for men, 1 drink a day for women) are okay. But drinking too much can lead to liver problems, high blood pressure, and other health problems. Family health  If you have a family, there are many things you can do together to improve your health. · Eat meals together as a family as often as possible. · Eat healthy foods. This includes fruits, vegetables, lean meats and dairy, and whole grains. · Include your family in your fitness plan. Most people think of activities such as jogging or tennis as the way to fitness, but there are many ways you and your family can be more active. Anything that makes you breathe hard and gets your heart pumping is exercise. Here are some tips:  ¨ Walk to do errands or to take your child to school or the bus. ¨ Go for a family bike ride after dinner instead of watching TV. Where can you learn more? Go to http://hilario-toy.info/. Enter S907 in the search box to learn more about \"A Healthy Lifestyle: Care Instructions. \"  Current as of: July 26, 2016  Content Version: 11.3  © 2858-8529 [employer]. Care instructions adapted under license by [employer] (which disclaims liability or warranty for this information). If you have questions about a medical condition or this instruction, always ask your healthcare professional. [address] any warranty or liability for your use of this information.

## 2017-10-12 NOTE — MR AVS SNAPSHOT
Visit Information Date & Time Provider Department Dept. Phone Encounter #  
 10/12/2017  9:40 AM Clay Collado MD [facility] [phone] [id number] Follow-up Instructions Return in about 3 months (around 1/12/2018) for NPH. Upcoming Health Maintenance Date Due DTaP/Tdap/Td series (1 - Tdap) 11/4/1963 FOBT Q 1 YEAR AGE 50-75 11/4/1992 ZOSTER VACCINE AGE 60> 9/4/2002 GLAUCOMA SCREENING Q2Y 11/4/2007 Pneumococcal 65+ High/Highest Risk (1 of 2 - PCV13) 11/4/2007 MEDICARE YEARLY EXAM 11/4/2007 INFLUENZA AGE 9 TO ADULT 8/1/2017 Allergies as of 10/12/2017  Review Complete On: 10/12/2017 By: Clay Collado MD  
 No Known Allergies Current Immunizations  Never Reviewed No immunizations on file. Not reviewed this visit You Were Diagnosed With   
  
 Codes Comments Normal pressure hydrocephalus    -  Primary ICD-10-CM: G91.2 ICD-9-CM: 331.5 B12 deficiency     ICD-10-CM: E53.8 ICD-9-CM: 266.2 Vitamin D deficiency     ICD-10-CM: E55.9 ICD-9-CM: 268.9 Gait instability     ICD-10-CM: R26.81 
ICD-9-CM: 164. 2 Memory loss     ICD-10-CM: R41.3 ICD-9-CM: 780.93 Vitals BP Pulse Height(growth percentile) Weight(growth percentile) SpO2 BMI  
 110/62 89 6' (1.829 m) 192 lb (87.1 kg) 96% 26.04 kg/m2 Smoking Status Never Smoker Vitals History BMI and BSA Data Body Mass Index Body Surface Area 26.04 kg/m 2 2.1 m 2 Preferred Pharmacy Pharmacy Name Phone [id number] [phone] Your Updated Medication List  
  
   
This list is accurate as of: 10/12/17 10:41 AM.  Always use your most recent med list.  
  
  
  
  
 atorvastatin 40 mg tablet Commonly known as:  LIPITOR Take 40 mg by mouth daily. cholecalciferol (vitamin D3) 4,000 unit Cap Commonly known as:  VITAMIN D3 Take 1 Cap by mouth daily. Indications: VITAMIN D DEFICIENCY  
  
 cyanocobalamin 1,000 mcg/mL injection Commonly known as:  VITAMIN B-12 Intramuscular injection of 1000 mcg every week for 4 weeks then every month  Indications: VITAMIN B12 DEFICIENCY  
  
 desmopressin 10 mcg/spray (0.1 mL) solution Commonly known as:  DDAVP NASAL  
USE 1 SPRAY AS DIRECTED AT BEDTIME (NEED TO BE SEEN) Syringe-Needle, Safety,Disp Un 3 mL 25 gauge x 1\" Syrg Use as directed  
  
 valsartan-hydroCHLOROthiazide 160-12.5 mg per tablet Commonly known as:  DIOVAN-HCT Take 1 tablet by mouth daily. Follow-up Instructions Return in about 3 months (around 1/12/2018) for NPH. Patient Instructions PRESCRIPTION REFILL POLICY [facility] Statement to Patients April 1, 2014 In an effort to ensure the large volume of patient prescription refills is processed in the most efficient and expeditious manner, we are asking our patients to assist us by calling your Pharmacy for all prescription refills, this will include also your  Mail Order Pharmacy. The pharmacy will contact our office electronically to continue the refill process. Please do not wait until the last minute to call your pharmacy. We need at least 48 hours (2days) to fill prescriptions. We also encourage you to call your pharmacy before going to  your prescription to make sure it is ready. With regard to controlled substance prescription refill requests (narcotic refills) that need to be picked up at our office, we ask your cooperation by providing us with at least 72 hours (3days) notice that you will need a refill. We will not refill narcotic prescription refill requests after 4:00pm on any weekday, Monday through Thursday, or after 2:00pm on Fridays, or on the weekends.   
  
We encourage everyone to explore another way of getting your prescription refill request processed using [email or web address], our patient web portal through our electronic medical record system. [email or web address].com is an efficient and effective way to communicate your medication request directly to the office and  downloadable as an marzena on your smart phone . [email or web address] also features a review functionality that allows you to view your medication list as well as leave messages for your physician. Are you ready to get connected? If so please review the attatched instructions or speak to any of our staff to get you set up right away! Thank you so much for your cooperation. Should you have any questions please contact our Practice Administrator. The Physicians and Staff,  [facility] A Healthy Lifestyle: Care Instructions Your Care Instructions A healthy lifestyle can help you feel good, stay at a healthy weight, and have plenty of energy for both work and play. A healthy lifestyle is something you can share with your whole family. A healthy lifestyle also can lower your risk for serious health problems, such as high blood pressure, heart disease, and diabetes. You can follow a few steps listed below to improve your health and the health of your family. Follow-up care is a key part of your treatment and safety. Be sure to make and go to all appointments, and call your doctor if you are having problems. Its also a good idea to know your test results and keep a list of the medicines you take. How can you care for yourself at home? · Do not eat too much sugar, fat, or fast foods. You can still have dessert and treats now and then. The goal is moderation. · Start small to improve your eating habits. Pay attention to portion sizes, drink less juice and soda pop, and eat more fruits and vegetables. ¨ Eat a healthy amount of food. A 3-ounce serving of meat, for example, is about the size of a deck of cards. Fill the rest of your plate with vegetables and whole grains. ¨ Limit the amount of soda and sports drinks you have every day. Drink more water when you are thirsty. ¨ Eat at least 5 servings of fruits and vegetables every day. It may seem like a lot, but it is not hard to reach this goal. A serving or helping is 1 piece of fruit, 1 cup of vegetables, or 2 cups of leafy, raw vegetables. Have an apple or some carrot sticks as an afternoon snack instead of a candy bar. Try to have fruits and/or vegetables at every meal. 
· Make exercise part of your daily routine. You may want to start with simple activities, such as walking, bicycling, or slow swimming. Try to be active 30 to 60 minutes every day. You do not need to do all 30 to 60 minutes all at once. For example, you can exercise 3 times a day for 10 or 20 minutes. Moderate exercise is safe for most people, but it is always a good idea to talk to your doctor before starting an exercise program. 
· Keep moving. Toribio Mireille the lawn, work in the garden, or The car easily beat. Take the stairs instead of the elevator at work. · If you smoke, quit. People who smoke have an increased risk for heart attack, stroke, cancer, and other lung illnesses. Quitting is hard, but there are ways to boost your chance of quitting tobacco for good. ¨ Use nicotine gum, patches, or lozenges. ¨ Ask your doctor about stop-smoking programs and medicines. ¨ Keep trying. In addition to reducing your risk of diseases in the future, you will notice some benefits soon after you stop using tobacco. If you have shortness of breath or asthma symptoms, they will likely get better within a few weeks after you quit. · Limit how much alcohol you drink. Moderate amounts of alcohol (up to 2 drinks a day for men, 1 drink a day for women) are okay. But drinking too much can lead to liver problems, high blood pressure, and other health problems. Family health If you have a family, there are many things you can do together to improve your health. · Eat meals together as a family as often as possible. · Eat healthy foods. This includes fruits, vegetables, lean meats and dairy, and whole grains. · Include your family in your fitness plan. Most people think of activities such as jogging or tennis as the way to fitness, but there are many ways you and your family can be more active. Anything that makes you breathe hard and gets your heart pumping is exercise. Here are some tips: 
¨ Walk to do errands or to take your child to school or the bus. ¨ Go for a family bike ride after dinner instead of watching TV. Where can you learn more? Go to http://hilarioLagiartoy.info/. Enter W272 in the search box to learn more about \"A Healthy Lifestyle: Care Instructions. \" Current as of: July 26, 2016 Content Version: 11.3 © 7454-1287 Smartdate. Care instructions adapted under license by [employer] (which disclaims liability or warranty for this information). If you have questions about a medical condition or this instruction, always ask your healthcare professional. [address] any warranty or liability for your use of this information. Introducing [facility]! Romayne Duster introduces Happyshop patient portal. Now you can access parts of your medical record, email your doctor's office, and request medication refills online. 1. In your internet browser, go to [email or web address] 2. Click on the First Time User? Click Here link in the Sign In box. You will see the New Member Sign Up page. 3. Enter your Happyshop Access Code exactly as it appears below. You will not need to use this code after youve completed the sign-up process. If you do not sign up before the expiration date, you must request a new code. · Happyshop Access Code: [email or web address] Expires: 11/20/2017  8:31 AM 
 
4.  Enter the last four digits of your Social Security Number (xxxx) and Date of Birth (mm/dd/yyyy) as indicated and click Submit. You will be taken to the next sign-up page. 5. Create a Brightfish ID. This will be your Brightfish login ID and cannot be changed, so think of one that is secure and easy to remember. 6. Create a Brightfish password. You can change your password at any time. 7. Enter your Password Reset Question and Answer. This can be used at a later time if you forget your password. 8. Enter your e-mail address. You will receive e-mail notification when new information is available in [address] 9. Click Sign Up. You can now view and download portions of your medical record. 10. Click the Download Summary menu link to download a portable copy of your medical information. If you have questions, please visit the Frequently Asked Questions section of the Brightfish website. Remember, Brightfish is NOT to be used for urgent needs. For medical emergencies, dial 911. Now available from your iPhone and Android! Please provide this summary of care documentation to your next provider. Your primary care clinician is listed as NOT ON FILE. If you have any questions after today's visit, please call [phone].

## 2017-11-06 ENCOUNTER — TELEPHONE (OUTPATIENT)
Dept: NEUROLOGY | Age: 77
End: 2017-11-06

## 2018-01-25 ENCOUNTER — OFFICE VISIT (OUTPATIENT)
Dept: NEUROLOGY | Age: 78
End: 2018-01-25

## 2018-01-25 VITALS
WEIGHT: 194 LBS | HEIGHT: 72 IN | DIASTOLIC BLOOD PRESSURE: 72 MMHG | HEART RATE: 82 BPM | OXYGEN SATURATION: 97 % | SYSTOLIC BLOOD PRESSURE: 120 MMHG | BODY MASS INDEX: 26.28 KG/M2

## 2018-01-25 DIAGNOSIS — G60.3 IDIOPATHIC PROGRESSIVE NEUROPATHY: Primary | ICD-10-CM

## 2018-01-25 DIAGNOSIS — E53.8 VITAMIN B12 DEFICIENCY: ICD-10-CM

## 2018-01-25 DIAGNOSIS — E11.8 TYPE 2 DIABETES MELLITUS WITH COMPLICATION, WITHOUT LONG-TERM CURRENT USE OF INSULIN (HCC): ICD-10-CM

## 2018-01-25 NOTE — PROGRESS NOTES
Name: Jonathan Rosas    Chief Complaint: neuropathy  Returns for follow up. Discussed the results of the MRI and the B12 deficiency. He complains that the neuropathy causes numbness in the right foot. He stays active. Assesment and Plan    1. Idiopathic progressive neuropathy    - L-Mfolate-B6 Phos-Methyl-B12 (METANX) 3-35-2 mg tab tab; Take 1 Tab by mouth daily. Dispense: 90 Tab; Refill: 3    2. Vitamin B12 deficiency  Started matanx    3. Type 2 diabetes mellitus with complication, without long-term current use of insulin ([facility])  contineu insulin  . Allergies  Review of patient's allergies indicates no known allergies. Medications  Current Outpatient Prescriptions   Medication Sig    cyanocobalamin (VITAMIN B-12) 1,000 mcg/mL injection Intramuscular injection of 1000 mcg every week for 4 weeks then every month  Indications: VITAMIN B12 DEFICIENCY    Syringe-Needle, Safety,Disp Un 3 mL 25 gauge x 1\" syrg Use as directed    cholecalciferol, vitamin D3, (VITAMIN D3) 4,000 unit cap Take 1 Cap by mouth daily. Indications: VITAMIN D DEFICIENCY    valsartan-hydrochlorothiazide (DIOVAN-HCT) 160-12.5 mg per tablet Take 1 tablet by mouth daily.  atorvastatin (LIPITOR) 40 mg tablet Take 40 mg by mouth daily.  desmopressin (DDAVP NASAL) 10 mcg/spray (0.1 mL) solution USE 1 SPRAY AS DIRECTED AT BEDTIME (NEED TO BE SEEN)     No current facility-administered medications for this visit. Medical History  Past Medical History:   Diagnosis Date    Diabetes ([address]     Hypertension     Ill-defined condition     high cholesterol     Review of Systems   Constitutional: Negative for chills and fever. HENT: Negative for ear pain. Eyes: Negative for pain and discharge. Respiratory: Negative for cough and hemoptysis. Cardiovascular: Negative for chest pain and claudication. Gastrointestinal: Negative for constipation and diarrhea. Genitourinary: Negative for flank pain and hematuria. Musculoskeletal: Negative for back pain and myalgias. Skin: Negative for itching and rash. Neurological: Positive for sensory change. Negative for headaches. Endo/Heme/Allergies: Negative for environmental allergies. Does not bruise/bleed easily. Psychiatric/Behavioral: Negative for depression and hallucinations. Exam:    Visit Vitals    /72    Pulse 82    Ht 6' (1.829 m)    Wt 194 lb (88 kg)    SpO2 97%    BMI 26.31 kg/m2      General: Well developed, well nourished. Patient in no apparent distress   Head: Normocephalic, atraumatic, anicteric sclera   Neck Normal ROM, No thyromegally   Lungs:  Clear to auscultation bilaterally, No wheezes or rubs   Cardiac: Regular rate and rhythm with no murmurs. Abd: Bowel sounds were audible. No tenderness on palpation   Ext: No pedal edema   Skin: No overt signs of rash or insect bites       NeurologicExam:  Mental Status: Alert and oriented to person place and time   Speech: Fluent no aphasia or dysarthria. Cranial Nerves:  II - XII Inact   Motor:  Right iliopsoas weakness. And symmetric weakness of small muscles of the feet. Reflexes:   +1/4 over the proximal tendons of the upper and lower extremities. +0/4 over distal tendons. Sensory:   Symmetric distal reduction in sensory perception affecting all modalities   Gait:  Wide based    Tremor:   No tremor noted. Cerebellar:  Coordination intact. Neurovascular: No carotid bruits. No JVD             Imaging      MRI Results (most recent):    Results from [place] encounter on 10/24/17   MRI BRAIN WO CONT   Narrative Indication: Normal pressure hydrocephalus. Memory loss. COMPARISON: None. TECHNIQUE: Multiplanar, multisequence noncontrast brain MRI per standard  protocol.     FINDINGS: The structures in the posterior fossa and supratentorial space are  developmentally normal. There are mild global age-related involutional changes  with mild prominence of the sulci and CSF-containing structures. Mild scattered  areas of T2/FLAIR hyperintense signal the periventricular deep white matter most  compatible with sequela of chronic microvascular ischemic disease. There are no  findings to support a diagnosis of normal pressure hydrocephalus. Specifically,  ventricular system is nondilated out of proportion to the degree of sulcal  prominence. No restricted diffusion to suggest acute ischemia. The orbital  contents appear normal. The pneumatized portions of the skull are clear. Impression IMPRESSION:  1. No acute intracranial findings. No MRI findings to suggest normal pressure  hydrocephalus. 2. Mild age-related involutional changes and mild chronic vascular ischemic  changes.           .  Lab Review    Lab Results   Component Value Date/Time    WBC 6.4 07/01/2009 12:25 PM    HCT 43.4 07/01/2009 12:25 PM    HGB 15.0 07/01/2009 12:25 PM    PLATELET 288 43/85/1716 12:25 PM       Lab Results   Component Value Date/Time    Sodium 138 07/01/2009 12:25 PM    Potassium 3.8 07/01/2009 12:25 PM    Chloride 103 07/01/2009 12:25 PM    CO2 29 07/01/2009 12:25 PM    Glucose 99 07/01/2009 12:25 PM    BUN 15 07/01/2009 12:25 PM    Creatinine 1.0 07/01/2009 12:25 PM    Calcium 8.7 07/01/2009 12:25 PM         Lab Results   Component Value Date/Time    Vitamin B12 278 09/12/2017 01:11 PM    Folate 13.4 09/12/2017 01:11 PM

## 2018-01-25 NOTE — PATIENT INSTRUCTIONS
[facility]   Statement to Patients  April 1, 2014      In an effort to ensure the large volume of patient prescription refills is processed in the most efficient and expeditious manner, we are asking our patients to assist us by calling your Pharmacy for all prescription refills, this will include also your  Mail Order Pharmacy. The pharmacy will contact our office electronically to continue the refill process. Please do not wait until the last minute to call your pharmacy. We need at least 48 hours (2days) to fill prescriptions. We also encourage you to call your pharmacy before going to  your prescription to make sure it is ready. With regard to controlled substance prescription refill requests (narcotic refills) that need to be picked up at our office, we ask your cooperation by providing us with at least 72 hours (3days) notice that you will need a refill. We will not refill narcotic prescription refill requests after 4:00pm on any weekday, Monday through Thursday, or after 2:00pm on Fridays, or on the weekends. We encourage everyone to explore another way of getting your prescription refill request processed using FIGMD, our patient web portal through our electronic medical record system. FIGMD is an efficient and effective way to communicate your medication request directly to the office and  downloadable as an marzena on your smart phone . FIGMD also features a review functionality that allows you to view your medication list as well as leave messages for your physician. Are you ready to get connected? If so please review the attatched instructions or speak to any of our staff to get you set up right away! Thank you so much for your cooperation. Should you have any questions please contact our Practice Administrator.     The Physicians and Staff,  [facility]     Please be advised there is a $25 fee for all paperwork to be completed from our  providers. This is to be paid by the patient prior to picking up the completed forms. A Healthy Lifestyle: Care Instructions  Your Care Instructions    A healthy lifestyle can help you feel good, stay at a healthy weight, and have plenty of energy for both work and play. A healthy lifestyle is something you can share with your whole family. A healthy lifestyle also can lower your risk for serious health problems, such as high blood pressure, heart disease, and diabetes. You can follow a few steps listed below to improve your health and the health of your family. Follow-up care is a key part of your treatment and safety. Be sure to make and go to all appointments, and call your doctor if you are having problems. It's also a good idea to know your test results and keep a list of the medicines you take. How can you care for yourself at home? · Do not eat too much sugar, fat, or fast foods. You can still have dessert and treats now and then. The goal is moderation. · Start small to improve your eating habits. Pay attention to portion sizes, drink less juice and soda pop, and eat more fruits and vegetables. ¨ Eat a healthy amount of food. A 3-ounce serving of meat, for example, is about the size of a deck of cards. Fill the rest of your plate with vegetables and whole grains. ¨ Limit the amount of soda and sports drinks you have every day. Drink more water when you are thirsty. ¨ Eat at least 5 servings of fruits and vegetables every day. It may seem like a lot, but it is not hard to reach this goal. A serving or helping is 1 piece of fruit, 1 cup of vegetables, or 2 cups of leafy, raw vegetables. Have an apple or some carrot sticks as an afternoon snack instead of a candy bar. Try to have fruits and/or vegetables at every meal.  · Make exercise part of your daily routine. You may want to start with simple activities, such as walking, bicycling, or slow swimming.  Try to be active 30 to 60 minutes every day. You do not need to do all 30 to 60 minutes all at once. For example, you can exercise 3 times a day for 10 or 20 minutes. Moderate exercise is safe for most people, but it is always a good idea to talk to your doctor before starting an exercise program.  · Keep moving. Elo Louis the lawn, work in the garden, or [employer]. Take the stairs instead of the elevator at work. · If you smoke, quit. People who smoke have an increased risk for heart attack, stroke, cancer, and other lung illnesses. Quitting is hard, but there are ways to boost your chance of quitting tobacco for good. ¨ Use nicotine gum, patches, or lozenges. ¨ Ask your doctor about stop-smoking programs and medicines. ¨ Keep trying. In addition to reducing your risk of diseases in the future, you will notice some benefits soon after you stop using tobacco. If you have shortness of breath or asthma symptoms, they will likely get better within a few weeks after you quit. · Limit how much alcohol you drink. Moderate amounts of alcohol (up to 2 drinks a day for men, 1 drink a day for women) are okay. But drinking too much can lead to liver problems, high blood pressure, and other health problems. Family health  If you have a family, there are many things you can do together to improve your health. · Eat meals together as a family as often as possible. · Eat healthy foods. This includes fruits, vegetables, lean meats and dairy, and whole grains. · Include your family in your fitness plan. Most people think of activities such as jogging or tennis as the way to fitness, but there are many ways you and your family can be more active. Anything that makes you breathe hard and gets your heart pumping is exercise. Here are some tips:  ¨ Walk to do errands or to take your child to school or the bus. ¨ Go for a family bike ride after dinner instead of watching TV. Where can you learn more?   Go to http://hilario-toy.info/. Enter I850 in the search box to learn more about \"A Healthy Lifestyle: Care Instructions. \"  Current as of: May 12, 2017  Content Version: 11.4  © 6612-3374 Healthwise, [employer]me[employer]. Care instructions adapted under license by [employer] (which disclaims liability or warranty for this information). If you have questions about a medical condition or this instruction, always ask your healthcare professional. [address] any warranty or liability for your use of this information.

## 2018-01-25 NOTE — MR AVS SNAPSHOT
[address], 
[address], [address] [facility] 83. 
[phone] Patient: Demetra Wilburn MRN: [id number] Visit Information Date & Time Provider Department Dept. Phone Encounter #  
 1/25/2018 11:20 AM Cheryl Cedeño MD [facility] [phone] [id number] Follow-up Instructions Return in about 1 year (around 1/25/2019) for memory, neuropathy b12. Upcoming Health Maintenance Date Due DTaP/Tdap/Td series (1 - Tdap) 11/4/1963 ZOSTER VACCINE AGE 60> 9/4/2002 GLAUCOMA SCREENING Q2Y 11/4/2007 Pneumococcal 65+ High/Highest Risk (1 of 2 - PCV13) 11/4/2007 MEDICARE YEARLY EXAM 11/4/2007 Influenza Age 5 to Adult 8/1/2017 Allergies as of 1/25/2018  Review Complete On: 1/25/2018 By: Cheryl Cedeño MD  
 No Known Allergies Current Immunizations  Never Reviewed No immunizations on file. Not reviewed this visit You Were Diagnosed With   
  
 Codes Comments Idiopathic progressive neuropathy    -  Primary ICD-10-CM: G60.3 ICD-9-CM: 273. 4 Vitamin B12 deficiency     ICD-10-CM: E53.8 ICD-9-CM: 266.2 Type 2 diabetes mellitus with complication, without long-term current use of insulin (HCC)     ICD-10-CM: E11.8 ICD-9-CM: 250.90 Vitals BP Pulse Height(growth percentile) Weight(growth percentile) SpO2 BMI  
 120/72 82 6' (1.829 m) 194 lb (88 kg) 97% 26.31 kg/m2 Smoking Status Never Smoker BMI and BSA Data Body Mass Index Body Surface Area  
 26.31 kg/m 2 2.11 m 2 Preferred Pharmacy Pharmacy Name [address], [address] Your Updated Medication List  
  
   
This list is accurate as of: 1/25/18 11:58 AM.  Always use your most recent med list.  
  
  
  
  
 atorvastatin 40 mg tablet Commonly known as:  LIPITOR Take 40 mg by mouth daily. cholecalciferol (vitamin D3) 4,000 unit Cap Commonly known as:  VITAMIN D3 Take 1 Cap by mouth daily. Indications: VITAMIN D DEFICIENCY  
  
 cyanocobalamin 1,000 mcg/mL injection Commonly known as:  VITAMIN B-12 Intramuscular injection of 1000 mcg every week for 4 weeks then every month  Indications: VITAMIN B12 DEFICIENCY  
  
 desmopressin 10 mcg/spray (0.1 mL) solution Commonly known as:  DDAVP NASAL  
USE 1 SPRAY AS DIRECTED AT BEDTIME (NEED TO BE SEEN) L-Mfolate-B6 Phos-Methyl-B12 3-35-2 mg Tab tab Commonly known as:  Virginia Saroj Take 1 Tab by mouth daily. Syringe-Needle, Safety,Disp Un 3 mL 25 gauge x 1\" Syrg Use as directed  
  
 valsartan-hydroCHLOROthiazide 160-12.5 mg per tablet Commonly known as:  DIOVAN-HCT Take 1 tablet by mouth daily. Prescriptions Sent to Pharmacy Refills L-Mfolate-B6 Phos-Methyl-B12 (METANX) 3-35-2 mg tab tab 3 Sig: Take 1 Tab by mouth daily. Class: Normal  
 Pharmacy: [facility] [address] [address], [address]  #: [phone] Route: Oral  
  
Follow-up Instructions Return in about 1 year (around 1/25/2019) for memory, neuropathy b12. Patient Instructions PRESCRIPTION REFILL POLICY [facility] Statement to Patients April 1, 2014 In an effort to ensure the large volume of patient prescription refills is processed in the most efficient and expeditious manner, we are asking our patients to assist us by calling your Pharmacy for all prescription refills, this will include also your  Mail Order Pharmacy. The pharmacy will contact our office electronically to continue the refill process. Please do not wait until the last minute to call your pharmacy. We need at least 48 hours (2days) to fill prescriptions. We also encourage you to call your pharmacy before going to  your prescription to make sure it is ready. With regard to controlled substance prescription refill requests (narcotic refills) that need to be picked up at our office, we ask your cooperation by providing us with at least 72 hours (3days) notice that you will need a refill. We will not refill narcotic prescription refill requests after 4:00pm on any weekday, Monday through Thursday, or after 2:00pm on Fridays, or on the weekends. We encourage everyone to explore another way of getting your prescription refill request processed using Ye[employer], our patient web portal through our electronic medical record system. Yeelink is an efficient and effective way to communicate your medication request directly to the office and  downloadable as an marzena on your smart phone . Ye[employer]ink also features a review functionality that allows you to view your medication list as well as leave messages for your physician. Are you ready to get connected? If so please review the attatched instructions or speak to any of our staff to get you set up right away! Thank you so much for your cooperation. Should you have any questions please contact our Practice Administrator. The Physicians and Staff,  [facility] Please be advised there is a $25 fee for all paperwork to be completed from our  providers. This is to be paid by the patient prior to picking up the completed forms. A Healthy Lifestyle: Care Instructions Your Care Instructions A healthy lifestyle can help you feel good, stay at a healthy weight, and have plenty of energy for both work and play. A healthy lifestyle is something you can share with your whole family. A healthy lifestyle also can lower your risk for serious health problems, such as high blood pressure, heart disease, and diabetes. You can follow a few steps listed below to improve your health and the health of your family. Follow-up care is a key part of your treatment and safety.  Be sure to make and go to all appointments, and call your doctor if you are having problems. It's also a good idea to know your test results and keep a list of the medicines you take. How can you care for yourself at home? · Do not eat too much sugar, fat, or fast foods. You can still have dessert and treats now and then. The goal is moderation. · Start small to improve your eating habits. Pay attention to portion sizes, drink less juice and soda pop, and eat more fruits and vegetables. ¨ Eat a healthy amount of food. A 3-ounce serving of meat, for example, is about the size of a deck of cards. Fill the rest of your plate with vegetables and whole grains. ¨ Limit the amount of soda and sports drinks you have every day. Drink more water when you are thirsty. ¨ Eat at least 5 servings of fruits and vegetables every day. It may seem like a lot, but it is not hard to reach this goal. A serving or helping is 1 piece of fruit, 1 cup of vegetables, or 2 cups of leafy, raw vegetables. Have an apple or some carrot sticks as an afternoon snack instead of a candy bar. Try to have fruits and/or vegetables at every meal. 
· Make exercise part of your daily routine. You may want to start with simple activities, such as walking, bicycling, or slow swimming. Try to be active 30 to 60 minutes every day. You do not need to do all 30 to 60 minutes all at once. For example, you can exercise 3 times a day for 10 or 20 minutes. Moderate exercise is safe for most people, but it is always a good idea to talk to your doctor before starting an exercise program. 
· Keep moving. Michaelvictor manuel  the lawn, work in the garden, or [employer]. Take the stairs instead of the elevator at work. · If you smoke, quit. People who smoke have an increased risk for heart attack, stroke, cancer, and other lung illnesses. Quitting is hard, but there are ways to boost your chance of quitting tobacco for good. ¨ Use nicotine gum, patches, or lozenges. ¨ Ask your doctor about stop-smoking programs and medicines. ¨ Keep trying. In addition to reducing your risk of diseases in the future, you will notice some benefits soon after you stop using tobacco. If you have shortness of breath or asthma symptoms, they will likely get better within a few weeks after you quit. · Limit how much alcohol you drink. Moderate amounts of alcohol (up to 2 drinks a day for men, 1 drink a day for women) are okay. But drinking too much can lead to liver problems, high blood pressure, and other health problems. Family health If you have a family, there are many things you can do together to improve your health. · Eat meals together as a family as often as possible. · Eat healthy foods. This includes fruits, vegetables, lean meats and dairy, and whole grains. · Include your family in your fitness plan. Most people think of activities such as jogging or tennis as the way to fitness, but there are many ways you and your family can be more active. Anything that makes you breathe hard and gets your heart pumping is exercise. Here are some tips: 
¨ Walk to do errands or to take your child to school or the bus. ¨ Go for a family bike ride after dinner instead of watching TV. Where can you learn more? Go to http://hilario-toy.info/. Enter Z529 in the search box to learn more about \"A Healthy Lifestyle: Care Instructions. \" Current as of: May 12, 2017 Content Version: 11.4 © 3973-2099 Healthwise, Incorporated. Care instructions adapted under license by [employer] (which disclaims liability or warranty for this information). If you have questions about a medical condition or this instruction, always ask your healthcare professional. [place] [address] any warranty or liability for your use of this information. Introducing [facility]!    
 [employer] introduces [employer] patient portal. Now you can access parts of your medical record, email your doctor's office, and request medication refills online. 1. In your internet browser, go to [email or web address] 2. Click on the First Time User? Click Here link in the Sign In box. You will see the New Member Sign Up page. 3. Enter your [employer] Access Code exactly as it appears below. You will not need to use this code after youve completed the sign-up process. If you do not sign up before the expiration date, you must request a new code. · [employer] Access Code: [email or web address] Expires: 3/1/2018  3:26 PM 
 
4. Enter the last four digits of your Social Security Number (xxxx) and Date of Birth (mm/dd/yyyy) as indicated and click Submit. You will be taken to the next sign-up page. 5. Create a [employer] Networks ID. This will be your [employer] login ID and cannot be changed, so think of one that is secure and easy to remember. 6. Create a [employer] password. You can change your password at any time. 7. Enter your Password Reset Question and Answer. This can be used at a later time if you forget your password. 8. Enter your e-mail address. You will receive e-mail notification when new information is available in [address] 9. Click Sign Up. You can now view and download portions of your medical record. 10. Click the Download Summary menu link to download a portable copy of your medical information. If you have questions, please visit the Frequently Asked Questions section of the [employer] website. Remember, [employer] is NOT to be used for urgent needs. For medical emergencies, dial 911. Now available from your iPhone and Android! Please provide this summary of care documentation to your next provider. Your primary care clinician is listed as Jeramy Sinclair. If you have any questions after today's visit, please call [phone].

## 2019-01-22 ENCOUNTER — OFFICE VISIT (OUTPATIENT)
Dept: NEUROLOGY | Age: 79
End: 2019-01-22

## 2019-01-22 VITALS
DIASTOLIC BLOOD PRESSURE: 64 MMHG | SYSTOLIC BLOOD PRESSURE: 120 MMHG | HEART RATE: 96 BPM | BODY MASS INDEX: 26.01 KG/M2 | WEIGHT: 192 LBS | OXYGEN SATURATION: 98 % | HEIGHT: 72 IN

## 2019-01-22 DIAGNOSIS — E53.8 VITAMIN B12 DEFICIENCY: ICD-10-CM

## 2019-01-22 DIAGNOSIS — G20 PARKINSON'S DISEASE (HCC): ICD-10-CM

## 2019-01-22 DIAGNOSIS — E55.9 VITAMIN D DEFICIENCY: ICD-10-CM

## 2019-01-22 DIAGNOSIS — D47.2 MGUS (MONOCLONAL GAMMOPATHY OF UNKNOWN SIGNIFICANCE): Primary | ICD-10-CM

## 2019-01-22 RX ORDER — CARBIDOPA AND LEVODOPA 25; 100 MG/1; MG/1
1 TABLET ORAL 3 TIMES DAILY
Qty: 90 TAB | Refills: 3 | Status: SHIPPED | OUTPATIENT
Start: 2019-01-22 | End: 2019-04-18 | Stop reason: SDUPTHER

## 2019-01-22 NOTE — PROGRESS NOTES
Name: Katelynn Jara    Chief Complaint: neuropathy  Returns for follow up. Discussed the results of the MRI and the B12 deficiency. He complains that the neuropathy causes numbness in the right foot. He stays active. Hehas triger finger in the rign finger of the left hand. He has trouble walking in formation because of his neuropathy. When he is driving his foot goes numb and he feels like he can't move the foot. Assesment and Plan    1. Idiopathic progressive neuropathy  Continue metanx  Retest for MGUS    2. Vitamin B12 deficiency  Started maeanx   Retest vitamin b12    3. Type 2 diabetes mellitus with complication, without long-term current use of insulin ([facility])  contineu insulin     4. Possible Parkoinson's  . trial sinement. bradkinesia shuffling gait. Masked     5. Vitamin D Deficiency  Retest for vitamin D    Allergies  Patient has no known allergies. Medications  Current Outpatient Medications   Medication Sig    L-Mfolate-B6 Phos-Methyl-B12 (METANX) 3-35-2 mg tab tab Take 1 Tab by mouth daily.  valsartan-hydrochlorothiazide (DIOVAN-HCT) 160-12.5 mg per tablet Take 1 tablet by mouth daily.  atorvastatin (LIPITOR) 40 mg tablet Take 40 mg by mouth daily.  cyanocobalamin (VITAMIN B-12) 1,000 mcg/mL injection Intramuscular injection of 1000 mcg every week for 4 weeks then every month  Indications: VITAMIN B12 DEFICIENCY    Syringe-Needle, Safety,Disp Un 3 mL 25 gauge x 1\" syrg Use as directed    cholecalciferol, vitamin D3, (VITAMIN D3) 4,000 unit cap Take 1 Cap by mouth daily. Indications: VITAMIN D DEFICIENCY    desmopressin (DDAVP NASAL) 10 mcg/spray (0.1 mL) solution USE 1 SPRAY AS DIRECTED AT BEDTIME (NEED TO BE SEEN)     No current facility-administered medications for this visit.          Medical History  Past Medical History:   Diagnosis Date    Diabetes ([facility] [address]     Hypertension     Ill-defined condition     high cholesterol     Review of Systems   Constitutional: Negative for chills and fever. HENT: Negative for ear pain. Eyes: Negative for pain and discharge. Respiratory: Negative for cough and hemoptysis. Cardiovascular: Negative for chest pain and claudication. Gastrointestinal: Negative for constipation and diarrhea. Genitourinary: Negative for flank pain and hematuria. Musculoskeletal: Negative for back pain and myalgias. Skin: Negative for itching and rash. Neurological: Positive for sensory change. Negative for headaches. Endo/Heme/Allergies: Negative for environmental allergies. Does not bruise/bleed easily. Psychiatric/Behavioral: Negative for depression and hallucinations. Exam:    Visit Vitals  /64   Pulse 96   Ht 6' (1.829 m)   Wt 192 lb (87.1 kg)   SpO2 98%   BMI 26.04 kg/m²      General: Well developed, well nourished. Patient in no apparent distress   Head: Normocephalic, atraumatic, anicteric sclera   Neck Normal ROM, No thyromegally   Lungs:  Clear to auscultation bilaterally, No wheezes or rubs   Cardiac: Regular rate and rhythm with no murmurs. Abd: Bowel sounds were audible. No tenderness on palpation   Ext: No pedal edema   Skin: No overt signs of rash or insect bites       NeurologicExam:  Mental Status: Alert and oriented to person place and time   Speech: Fluent no aphasia or dysarthria. Cranial Nerves:  II - XII Inact   Motor:  Right iliopsoas weakness. And symmetric weakness of small muscles of the feet. Reflexes:   +1/4 over the proximal tendons of the upper and lower extremities. +0/4 over distal tendons. Sensory:   Symmetric distal reduction in sensory perception affecting all modalities   Gait:  Wide based, shuffles. Tremor:   No tremor noted. Cerebellar:  Coordination intact. Neurovascular: No carotid bruits. No JVD             Imaging      MRI Results (most recent):  Results from [place] encounter on 10/24/17   MRI BRAIN WO CONT    Narrative Indication: Normal pressure hydrocephalus. Memory loss. COMPARISON: None. TECHNIQUE: Multiplanar, multisequence noncontrast brain MRI per standard  protocol. FINDINGS: The structures in the posterior fossa and supratentorial space are  developmentally normal. There are mild global age-related involutional changes  with mild prominence of the sulci and CSF-containing structures. Mild scattered  areas of T2/FLAIR hyperintense signal the periventricular deep white matter most  compatible with sequela of chronic microvascular ischemic disease. There are no  findings to support a diagnosis of normal pressure hydrocephalus. Specifically,  ventricular system is nondilated out of proportion to the degree of sulcal  prominence. No restricted diffusion to suggest acute ischemia. The orbital  contents appear normal. The pneumatized portions of the skull are clear. Impression IMPRESSION:  1. No acute intracranial findings. No MRI findings to suggest normal pressure  hydrocephalus. 2. Mild age-related involutional changes and mild chronic vascular ischemic  changes.          .  Lab Review    Lab Results   Component Value Date/Time    WBC 6.4 07/01/2009 12:25 PM    HCT 43.4 07/01/2009 12:25 PM    HGB 15.0 07/01/2009 12:25 PM    PLATELET 773 69/57/4679 12:25 PM       Lab Results   Component Value Date/Time    Sodium 138 07/01/2009 12:25 PM    Potassium 3.8 07/01/2009 12:25 PM    Chloride 103 07/01/2009 12:25 PM    CO2 29 07/01/2009 12:25 PM    Glucose 99 07/01/2009 12:25 PM    BUN 15 07/01/2009 12:25 PM    Creatinine 1.0 07/01/2009 12:25 PM    Calcium 8.7 07/01/2009 12:25 PM         Lab Results   Component Value Date/Time    Vitamin B12 278 09/12/2017 01:11 PM    Folate 13.4 09/12/2017 01:11 PM

## 2019-01-24 LAB
25(OH)D3+25(OH)D2 SERPL-MCNC: 30.3 NG/ML (ref 30–100)
ALBUMIN SERPL ELPH-MCNC: 3.7 G/DL (ref 2.9–4.4)
ALBUMIN/GLOB SERPL: 1.2 {RATIO} (ref 0.7–1.7)
ALPHA1 GLOB SERPL ELPH-MCNC: 0.2 G/DL (ref 0–0.4)
ALPHA2 GLOB SERPL ELPH-MCNC: 0.6 G/DL (ref 0.4–1)
B-GLOBULIN SERPL ELPH-MCNC: 0.9 G/DL (ref 0.7–1.3)
GAMMA GLOB SERPL ELPH-MCNC: 1.2 G/DL (ref 0.4–1.8)
GLOBULIN SER CALC-MCNC: 3 G/DL (ref 2.2–3.9)
M PROTEIN SERPL ELPH-MCNC: NORMAL G/DL
PLEASE NOTE, 011150: NORMAL
PROT SERPL-MCNC: 6.7 G/DL (ref 6–8.5)
VIT B12 SERPL-MCNC: >2000 PG/ML (ref 232–1245)

## 2019-01-28 DIAGNOSIS — G60.3 IDIOPATHIC PROGRESSIVE NEUROPATHY: ICD-10-CM

## 2019-01-28 NOTE — TELEPHONE ENCOUNTER
Patient called to request a refill on his Metanx. Patient is asking that the rx be sent to:    [address] [place] [address], [address]  Fax # [phone]    He would like a call back when it's been sent to the pharmacy.       [phone]

## 2019-01-30 NOTE — TELEPHONE ENCOUNTER
Future Appointments   Date Time Provider Carina Annel   4/30/2019  1:40 PM Latrell Flores MD [address] [place] [address]                         Last Appointment My Department:  1/22/2019    Would you like to refill below? Requested Prescriptions     Pending Prescriptions Disp Refills    L-Mfolate-B6 Phos-Methyl-B12 (METANX) 3-35-2 mg tab tab 90 Tab 3     Sig: Take 1 Tab by mouth daily.

## 2019-04-18 DIAGNOSIS — G20 PARKINSON'S DISEASE (HCC): ICD-10-CM

## 2019-04-18 NOTE — TELEPHONE ENCOUNTER
Received a fax request for this medication refill. Future Appointments   Date Time Provider Carina Brunsoni   4/30/2019  1:40 PM MD Jt Moncada                         Last Appointment My Department:  1/22/2019    Would you like to refill below? Requested Prescriptions     Pending Prescriptions Disp Refills    carbidopa-levodopa (SINEMET)  mg per tablet 90 Tab 3     Sig: Take 1 Tab by mouth three (3) times daily.

## 2019-04-19 RX ORDER — CARBIDOPA AND LEVODOPA 25; 100 MG/1; MG/1
1 TABLET ORAL 3 TIMES DAILY
Qty: 90 TAB | Refills: 3 | Status: SHIPPED | OUTPATIENT
Start: 2019-04-19 | End: 2019-08-08 | Stop reason: SDUPTHER

## 2019-04-30 ENCOUNTER — OFFICE VISIT (OUTPATIENT)
Dept: NEUROLOGY | Age: 79
End: 2019-04-30

## 2019-04-30 VITALS
DIASTOLIC BLOOD PRESSURE: 74 MMHG | HEART RATE: 83 BPM | SYSTOLIC BLOOD PRESSURE: 122 MMHG | BODY MASS INDEX: 26.14 KG/M2 | OXYGEN SATURATION: 98 % | HEIGHT: 72 IN | WEIGHT: 193 LBS

## 2019-04-30 DIAGNOSIS — E11.8 TYPE 2 DIABETES MELLITUS WITH COMPLICATION, WITHOUT LONG-TERM CURRENT USE OF INSULIN (HCC): ICD-10-CM

## 2019-04-30 DIAGNOSIS — D47.2 MGUS (MONOCLONAL GAMMOPATHY OF UNKNOWN SIGNIFICANCE): ICD-10-CM

## 2019-04-30 DIAGNOSIS — R41.3 MEMORY LOSS: Primary | ICD-10-CM

## 2019-04-30 DIAGNOSIS — G20 PARKINSON'S DISEASE (HCC): ICD-10-CM

## 2019-04-30 NOTE — PATIENT INSTRUCTIONS
A Healthy Lifestyle: Care Instructions  Your Care Instructions    A healthy lifestyle can help you feel good, stay at a healthy weight, and have plenty of energy for both work and play. A healthy lifestyle is something you can share with your whole family. A healthy lifestyle also can lower your risk for serious health problems, such as high blood pressure, heart disease, and diabetes. You can follow a few steps listed below to improve your health and the health of your family. Follow-up care is a key part of your treatment and safety. Be sure to make and go to all appointments, and call your doctor if you are having problems. It's also a good idea to know your test results and keep a list of the medicines you take. How can you care for yourself at home? · Do not eat too much sugar, fat, or fast foods. You can still have dessert and treats now and then. The goal is moderation. · Start small to improve your eating habits. Pay attention to portion sizes, drink less juice and soda pop, and eat more fruits and vegetables. ? Eat a healthy amount of food. A 3-ounce serving of meat, for example, is about the size of a deck of cards. Fill the rest of your plate with vegetables and whole grains. ? Limit the amount of soda and sports drinks you have every day. Drink more water when you are thirsty. ? Eat at least 5 servings of fruits and vegetables every day. It may seem like a lot, but it is not hard to reach this goal. A serving or helping is 1 piece of fruit, 1 cup of vegetables, or 2 cups of leafy, raw vegetables. Have an apple or some carrot sticks as an afternoon snack instead of a candy bar. Try to have fruits and/or vegetables at every meal.  · Make exercise part of your daily routine. You may want to start with simple activities, such as walking, bicycling, or slow swimming. Try to be active 30 to 60 minutes every day. You do not need to do all 30 to 60 minutes all at once.  For example, you can exercise 3 times a day for 10 or 20 minutes. Moderate exercise is safe for most people, but it is always a good idea to talk to your doctor before starting an exercise program.  · Keep moving. Franco Damonter the lawn, work in the garden, or [employer]. Take the stairs instead of the elevator at work. · If you smoke, quit. People who smoke have an increased risk for heart attack, stroke, cancer, and other lung illnesses. Quitting is hard, but there are ways to boost your chance of quitting tobacco for good. ? Use nicotine gum, patches, or lozenges. ? Ask your doctor about stop-smoking programs and medicines. ? Keep trying. In addition to reducing your risk of diseases in the future, you will notice some benefits soon after you stop using tobacco. If you have shortness of breath or asthma symptoms, they will likely get better within a few weeks after you quit. · Limit how much alcohol you drink. Moderate amounts of alcohol (up to 2 drinks a day for men, 1 drink a day for women) are okay. But drinking too much can lead to liver problems, high blood pressure, and other health problems. Family health  If you have a family, there are many things you can do together to improve your health. · Eat meals together as a family as often as possible. · Eat healthy foods. This includes fruits, vegetables, lean meats and dairy, and whole grains. · Include your family in your fitness plan. Most people think of activities such as jogging or tennis as the way to fitness, but there are many ways you and your family can be more active. Anything that makes you breathe hard and gets your heart pumping is exercise. Here are some tips:  ? Walk to do errands or to take your child to school or the bus.  ? Go for a family bike ride after dinner instead of watching TV. Where can you learn more? Go to http://hilario-toy.info/. Enter A537 in the search box to learn more about \"A Healthy Lifestyle: Care Instructions. \"  Current as of: September 11, 2018  Content Version: 11.9  © 3105-5459 [employer]. Care instructions adapted under license by [employer] (which disclaims liability or warranty for this information). If you have questions about a medical condition or this instruction, always ask your healthcare professional. [address] any warranty or liability for your use of this information. Office Policies      Paperwork            Any paperwork that needs to be completed has a 3 WEEK turnaround time. Phone calls/patient messages:    · Please allow up to 24 hours for someone in the office to contact you about your call or message. Be mindful your provider may be out of the office or your message may require further review. We encourage you to use [employer] for your messages as this is a faster, more efficient way to communicate with our office           Medication Refills:    · Prescription medications require up to 48 business hours to process. We encourage you to use [employer] for your refills. · For controlled medications: Please allow up to 72 business hours to process. Certain medications may require you to  a written prescription at our office.   · NO narcotic/controlled medications will be prescribed after 4pm Monday through Friday or on weekends

## 2019-04-30 NOTE — PROGRESS NOTES
Name: Jasmyne Spencer    Chief Complaint: neuropathy    Getting hand held controls for the car. He has had trigger finger surgery on the left. He has been compliant with his medications. He finds that his walking improves with Sinemet. He is not a stooped according to his wife. She would like him to continue taking the drug as it would he. He has suffered no falls. He is concerned about his memory. His wife states that he repeats himself will forget conversations we discussed different kinds of dementia including Parkinson's related dementia as well as Alzheimer's. We agreed that because of his high functioning level a neuropsych examination would be preferable at least serve as a baseline should further concern arise. Assesment and Plan    1. 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 Vitamin B12 deficiency  Started Metanx      3. Type 2 diabetes mellitus with complication, without long-term current use of insulin ([facility])  contineu insulin     4. Possible Parkinson's  Symptoms have improved  Continue Sinemet    5. Vitamin D Deficiency  Continue vitamin D    5. Memory loss  Will send to neuropsyc testing. Suspected normal pressure hydrocephalus however the MRI did not support this. May repeat the MRI in the future    45  minutes spent more than 50% spent in face to face  examination and discussion of treatment options and approach  Allergies  Patient has no known allergies. Medications  Current Outpatient Medications   Medication Sig    carbidopa-levodopa (SINEMET)  mg per tablet Take 1 Tab by mouth three (3) times daily.  L-Mfolate-B6 Phos-Methyl-B12 (METANX) 3-35-2 mg tab tab Take 1 Tab by mouth daily.     cyanocobalamin (VITAMIN B-12) 1,000 mcg/mL injection Intramuscular injection of 1000 mcg every week for 4 weeks then every month  Indications: VITAMIN B12 DEFICIENCY    Syringe-Needle, Safety,Disp Un 3 mL 25 gauge x 1\" syrg Use as directed    cholecalciferol, vitamin D3, (VITAMIN D3) 4,000 unit cap Take 1 Cap by mouth daily. Indications: VITAMIN D DEFICIENCY    desmopressin (DDAVP NASAL) 10 mcg/spray (0.1 mL) solution USE 1 SPRAY AS DIRECTED AT BEDTIME (NEED TO BE SEEN)    valsartan-hydrochlorothiazide (DIOVAN-HCT) 160-12.5 mg per tablet Take 1 tablet by mouth daily.  atorvastatin (LIPITOR) 40 mg tablet Take 40 mg by mouth daily. No current facility-administered medications for this visit. Medical History  Past Medical History:   Diagnosis Date    Diabetes ([facility] [address]     Hypertension     Ill-defined condition     high cholesterol     Review of Systems   Constitutional: Negative for chills and fever. HENT: Negative for ear pain. Eyes: Negative for pain and discharge. Respiratory: Negative for cough and hemoptysis. Cardiovascular: Negative for chest pain and claudication. Gastrointestinal: Negative for constipation and diarrhea. Genitourinary: Negative for flank pain and hematuria. Musculoskeletal: Negative for back pain and myalgias. Skin: Negative for itching and rash. Neurological: Positive for sensory change. Negative for headaches. Endo/Heme/Allergies: Negative for environmental allergies. Does not bruise/bleed easily. Psychiatric/Behavioral: Negative for depression and hallucinations. Exam:    Visit Vitals  /74   Pulse 83   Ht 6' (1.829 m)   Wt 193 lb (87.5 kg)   SpO2 98%   BMI 26.18 kg/m²      General: Well developed, well nourished. Patient in no apparent distress   Head: Normocephalic, atraumatic, anicteric sclera   Neck Normal ROM, No thyromegally   Lungs:  Clear to auscultation bilaterally, No wheezes or rubs   Cardiac: Regular rate and rhythm with no murmurs. Abd: Bowel sounds were audible.  No tenderness on palpation   Ext: No pedal edema   Skin: No overt signs of rash or insect bites       NeurologicExam:  Mental Status: Alert and oriented to person place and time   Speech: Fluent no aphasia or dysarthria. Cranial Nerves:  II - XII Inact   Motor:  Right iliopsoas weakness. And symmetric weakness of small muscles of the feet. Reflexes:   +1/4 over the proximal tendons of the upper and lower extremities. +0/4 over distal tendons. Sensory:   Symmetric distal reduction in sensory perception affecting all modalities   Gait:  Wide based, shuffling. Tremor:   No tremor noted. Cerebellar:  Coordination intact. Neurovascular: No carotid bruits. No JVD       Imaging      MRI Results (most recent):  Results from [place] encounter on 10/24/17   MRI BRAIN WO CONT    Narrative Indication: Normal pressure hydrocephalus. Memory loss. COMPARISON: None. TECHNIQUE: Multiplanar, multisequence noncontrast brain MRI per standard  protocol. FINDINGS: The structures in the posterior fossa and supratentorial space are  developmentally normal. There are mild global age-related involutional changes  with mild prominence of the sulci and CSF-containing structures. Mild scattered  areas of T2/FLAIR hyperintense signal the periventricular deep white matter most  compatible with sequela of chronic microvascular ischemic disease. There are no  findings to support a diagnosis of normal pressure hydrocephalus. Specifically,  ventricular system is nondilated out of proportion to the degree of sulcal  prominence. No restricted diffusion to suggest acute ischemia. The orbital  contents appear normal. The pneumatized portions of the skull are clear. Impression IMPRESSION:  1. No acute intracranial findings. No MRI findings to suggest normal pressure  hydrocephalus. 2. Mild age-related involutional changes and mild chronic vascular ischemic  changes.          .  Lab Review    Lab Results   Component Value Date/Time    WBC 6.4 07/01/2009 12:25 PM    HCT 43.4 07/01/2009 12:25 PM    HGB 15.0 07/01/2009 12:25 PM    PLATELET 063 16/65/2490 12:25 PM       Lab Results   Component Value Date/Time    Sodium 138 07/01/2009 12:25 PM    Potassium 3.8 07/01/2009 12:25 PM    Chloride 103 07/01/2009 12:25 PM    CO2 29 07/01/2009 12:25 PM    Glucose 99 07/01/2009 12:25 PM    BUN 15 07/01/2009 12:25 PM    Creatinine 1.0 07/01/2009 12:25 PM    Calcium 8.7 07/01/2009 12:25 PM         Lab Results   Component Value Date/Time    Vitamin B12 >2000 (H) 01/22/2019 01:17 PM    Folate 13.4 09/12/2017 01:11 PM

## 2019-05-21 ENCOUNTER — OFFICE VISIT (OUTPATIENT)
Dept: NEUROLOGY | Age: 79
End: 2019-05-21

## 2019-05-21 DIAGNOSIS — G31.84 MILD NEUROCOGNITIVE DISORDER: Primary | ICD-10-CM

## 2019-05-21 NOTE — PROGRESS NOTES
[facility] at [place] [address], [address]    Office:  [phone]  Fax: [phone]                 Initial Office Exam  Patient Name: Yen Perdomo  Age: 70 y.o. Gender: male   Handedness: left handed   Presenting Concern: Changes in memory function  Primary Care Physician: Anna Bay MD  Referring Provider No ref. provider found      REASON FOR REFERRAL:  This comprehensive and medically necessary neuropsychological assessment was requested to assist a differential diagnosis of a dementing condition. The use and purpose of this examination, as well as the extent and limitations of confidentiality, were explained prior to obtaining permission to participate. Instructions were provided regarding the necessity to put forth optimal effort and answer questions truthfully in order to obtain reliable and accurate test results. PERTINENT HISTORY:  Mr. Krissy eLe presented for a neuropsychological assessment at the recommendation of his treating physician secondary to complaints of declining memory in the past year, shuffling feet, recalling events misplacing things, decreased concentration, increased anxiety and feelings of sadness. From a brief review of his medical and personal history there has been a history of neuropathy and sleep-related problems. No significant neurological injury or illness has been noted or reported. He did not report experiencing depression or anxiety in the past.      Mr. Krissy Lee does not  report any problems at birth or difficulties meeting developmental milestones. He reports that he had an adequate level of family support and was not subject to trauma or abuse as a child.   Mr. Krissy Lee does not  report being retain in school or receiving special assistance in any of He classes or subjects. Mr. Krissy Lee completing 16 years of education. Mr. Krissy Lee does  exercise on a regular basis and does  maintain a balanced diet. He does  report problems with sleep and does not  complain of pain. He does  participate in mentally stimulating activities and active in community groups. Mr. Krissy Lee does not  have concerns regarding prescription medications, family members, place of residence, or financial stressors. Mr. Krissy Lee indicated that he is independent in his instrumental activities of daily living, including shopping, meal preparation, housekeeping, doing laundry, driving a car, managing medications, and finances. Current Outpatient Medications   Medication Sig    carbidopa-levodopa (SINEMET)  mg per tablet Take 1 Tab by mouth three (3) times daily.  L-Mfolate-B6 Phos-Methyl-B12 (METANX) 3-35-2 mg tab tab Take 1 Tab by mouth daily.  cyanocobalamin (VITAMIN B-12) 1,000 mcg/mL injection Intramuscular injection of 1000 mcg every week for 4 weeks then every month  Indications: VITAMIN B12 DEFICIENCY    Syringe-Needle, Safety,Disp Un 3 mL 25 gauge x 1\" syrg Use as directed    cholecalciferol, vitamin D3, (VITAMIN D3) 4,000 unit cap Take 1 Cap by mouth daily. Indications: VITAMIN D DEFICIENCY    desmopressin (DDAVP NASAL) 10 mcg/spray (0.1 mL) solution USE 1 SPRAY AS DIRECTED AT BEDTIME (NEED TO BE SEEN)    valsartan-hydrochlorothiazide (DIOVAN-HCT) 160-12.5 mg per tablet Take 1 tablet by mouth daily.  atorvastatin (LIPITOR) 40 mg tablet Take 40 mg by mouth daily. No current facility-administered medications for this visit. Past Medical History:   Diagnosis Date    Diabetes ([address]     Hypertension     Ill-defined condition     high cholesterol       No flowsheet data found.     No data recorded    Past Surgical History:   Procedure Laterality Date    HX UROLOGICAL      surgeries for frequent urination       Social History     Socioeconomic History    Marital status:      Spouse name: Not on file    Number of children: Not on file    Years of education: Not on file    Highest education level: Not on file   Tobacco Use    Smoking status: Never Smoker    Smokeless tobacco: Never Used   Substance and Sexual Activity    Alcohol use: Yes     Comment: 1 etoh drink/day    Drug use: No       Family History   Problem Relation Age of Onset    Mental Retardation Daughter        CT Results (most recent):  No results found for this or any previous visit. MRI Results (most recent):  Results from [place] encounter on 10/24/17   MRI BRAIN WO CONT    Narrative Indication: Normal pressure hydrocephalus. Memory loss. COMPARISON: None. TECHNIQUE: Multiplanar, multisequence noncontrast brain MRI per standard  protocol. FINDINGS: The structures in the posterior fossa and supratentorial space are  developmentally normal. There are mild global age-related involutional changes  with mild prominence of the sulci and CSF-containing structures. Mild scattered  areas of T2/FLAIR hyperintense signal the periventricular deep white matter most  compatible with sequela of chronic microvascular ischemic disease. There are no  findings to support a diagnosis of normal pressure hydrocephalus. Specifically,  ventricular system is nondilated out of proportion to the degree of sulcal  prominence. No restricted diffusion to suggest acute ischemia. The orbital  contents appear normal. The pneumatized portions of the skull are clear. Impression IMPRESSION:  1. No acute intracranial findings. No MRI findings to suggest normal pressure  hydrocephalus. 2. Mild age-related involutional changes and mild chronic vascular ischemic  changes. FUNCTIONAL ABILITY:  Patient does total self care  No flowsheet data found.       MENTAL STATUS:    Orientation: Fully   oriented   Eye Contact: Adequate   Motor Behavior:  Ambulated independently   Speech:  Fluent, coherent   Thought Process: WNL   Thought Content: No evidence of delusions or hallucinations   Suicidal ideations: none   Mood:  ethymic   Affect:  Full range`   Concentration:  WNL   Abstraction:  WNL   Insight:  adequate   Reliability: poor   Judgment:  limited     On the Modified Mini-Mental Status Exam: = 77/100 (<1%ile)      DIAGNOSTIC IMPRESSIONS:    ICD-10-CM ICD-9-CM    1. 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 Complete a comprehensive neuropsychological assessment to provide a differential diagnosis of presenting concerns as well as to assist with disposition and treatment planning as appropriate. 2. Consider compensatory and remedial cognitive training. 3. Consider an adaptive driving evaluation. [id number] x 1 Review of records. Face to face interview w/ patient. Determine test protocol: 60 minutes. Total 1 unit        Marcia Romeo, PhD, ABPP, LCP  Licensed Clinical Psychologist/ Neuropsychologist        This note will not be viewable in [address]

## 2019-06-25 ENCOUNTER — OFFICE VISIT (OUTPATIENT)
Dept: NEUROLOGY | Age: 79
End: 2019-06-25

## 2019-06-25 DIAGNOSIS — G31.84 MILD NEUROCOGNITIVE DISORDER: Primary | ICD-10-CM

## 2019-06-25 DIAGNOSIS — F06.4 ANXIETY DISORDER DUE TO GENERAL MEDICAL CONDITION: ICD-10-CM

## 2019-06-25 DIAGNOSIS — R41.3 MEMORY LOSS: ICD-10-CM

## 2019-06-25 NOTE — LETTER
7/4/19 Patient: Kim Ta YOB: 1940 Date of Visit: 6/25/2019 Betina Rodriguez MD 
[id number] [facility] [address] VIA Facsimile: [phone] Dear Betina Rodriguez MD, Thank you for referring Mr. Emily Montano to [address] [place] [address] for evaluation. My notes for this consultation are attached. If you have questions, please do not hesitate to call me. I look forward to following your patient along with you.  
 
 
Sincerely, 
 
Alex Mcfarland, PHD

## 2019-06-26 NOTE — PROGRESS NOTES
This note will not be viewable in [address] [facility] at [address]    Office:  [phone]  Fax: [phone]                                               Neuropsychological Evaluation Report    Patient Name: Charanjit López  Age: 70 y.o. Gender: male   Handedness: left handed   Presenting Concern: Changes in memory function  Primary Care Physician: Reynaldo Singh MD  Referring Provider No ref. provider found     PATIENT HISTORY (OBTAINED DURING INITIAL CLINICAL EVALUATION):    REASON FOR REFERRAL:  This comprehensive and medically necessary neuropsychological assessment was requested to assist a differential diagnosis of a dementing condition. The use and purpose of this examination, as well as the extent and limitations of confidentiality, were explained prior to obtaining permission to participate. Instructions were provided regarding the necessity to put forth optimal effort and answer questions truthfully in order to obtain reliable and accurate test results.     PERTINENT HISTORY:  Mr. Herminio Arevalo presented for a neuropsychological assessment at the recommendation of his treating physician secondary to complaints of declining memory in the past year, shuffling feet, recalling events misplacing things, decreased concentration, increased anxiety and feelings of sadness. From a brief review of his medical and personal history there has been a history of neuropathy and sleep-related problems. No significant neurological injury or illness has been noted or reported. He did not report experiencing depression or anxiety in the past.       Mr. Herminio Arevalo does not  report any problems at birth or difficulties meeting developmental milestones. He reports that he had an adequate level of family support and was not subject to trauma or abuse as a child.   Mr. Herminio Arevalo does not  report being retain in school or receiving special assistance in any of his classes or subjects. Mr. Mary Reynolds completed 16 years of education.     Mr. Mary Reynolds does  exercise on a regular basis and does  maintain a balanced diet. He does  report problems with sleep and does not  complain of pain. He does  participate in mentally stimulating activities and active in community groups. Mr. Mary Reynolds does not  have concerns regarding prescription medications, family members, place of residence, or financial stressors. Mr. Mary Reynolds indicated that he is independent in his instrumental activities of daily living, including shopping, meal preparation, housekeeping, doing laundry, managing medications, and finances.       METHODS OF ASSESSMENT (Current Evaluation):  Clinician Administered:  Clinical Interview  Review of Medical Records  Clinical Assessment Scales for the Elderly (CASE)  Clock Drawing Task  Modified Mini-Mental Status Exam (3MS)  Incidental Memory Test  Hospital Anxiety and Depression Scale  Revised Memory and Behavior Checklist    Technician Administered:  Controlled Oral Word Association Test (FAS)  Neuropsychological Assessment Battery:   NAB: Attention Module   NAB: Design Construction Subtest  RBANS-A  Trail Making Test    TEST OBSERVATIONS:  Mr. Mary Reynolds arrived promptly for the testing session. Dress and grooming were appropriate; physical presentation was unchanged from that observed during the clinical interview. Speech was fluent, intelligible, and goal-directed. Affect was congruent with the euthymic mood conveyed. Mr. Mary Reynolds was adequately cooperative and appeared to put forth good effort throughout this examination. Report with the examiner was adequately established and maintained. Minimal prompting was required. Comprehension of test instructions was not problematic. Performance motivation was objectively measured by two instruments (Reliable Digit Span, RBANS ES), and Mr. Chowdary produced normal scores on these measures. Accordingly, test findings below do not appear to be the product of disingenuous effort. Given the above observations, plus comments contained in the Mental Status section, the results of this examination are regarded as reasonably reliable and valid. TEST RESULTS:  Quantitative test results are derived from comparisons to age and education corrected cohort normative data, where applicable. Percentiles are included in these instances. Qualitative test results are determined using clinical observations.              General Orientation and Awareness:       Orientation to person yes   Time yes  Place yes  Circumstance yes               Awareness of deficits: Poor                   Cognitive performance validity testing:  Valid                           Attention/Concentration:                Classification:  Coding (16 percentile)        Low Average  Simple visuomotor tracking (16 percentile)               Low Average  Digits forward (66 percentile)                 Average  Digits backward (1 percentile)                 Severely Impaired  Visual scanning (31 percentile)                            Average  Simple information processing speed (38 percentile)       Average   Complex information processing speed (46 percentile)        Average  Attention to visual detail of driving scenes (< 1 percentile)             Severely Impaired    Visuospatial and Constructional Praxis:     Figure copy (< 0.1 percentile)                                      Severely Impaired  Line orientation (76 percentile)                                                 Average    Design Construction (14 percentile)               Low Average    Language:         Picture naming (20 percentile)                               Low Average  Semantic fluency (3 percentile)                    Moderately Impaired    Memory and Learning:       Immediate word list learning (7 percentile)               Mildly Impaired  Delayed word list recall (6 percentile)                  Mildly Impaired  Delayed word list recognition (< 0.1 percentile)               Significantly Impaired  Immediate story memory (34 percentile)                  Average  Delayed story recall (4 percentile)                Moderately Impaired  Delayed figure recall (0.1 percentile)                Severely Impaired    Cognitive Tests of Executive Functioning:     Ability to think flexibly, Trail Making B (79 percentile)               High Average      Intellectual and Basic Cognitive Functioning (WAIS-IV):  ACS Test of pre-morbid functioning reading recognition lower limit estimated WAIS-IV FSIQ score:       Estimated full scale IQ:            66 percentile     Average      Emotional Functioning:  Mr. Melina Finney was administered a self-report instrument of psychosocial functioning. On this measure he is acknowledging a significant number cognitive difficulties and fears associated with aging. It is likely that he has numerous worries and anxieties. It is likely that he has recurrent worries, with some nonproductive rumination, and obsessive tendencies. IMPRESSIONS:  Mr. Melina Finney was administered a battery of tests to determine current level of neuropsychological functioning. This evaluation specifically assessed his attention, language, visual-spatial, memory, and executive functioning. His overall level of functioning was in the low average range, which represents a decline from his premorbid level. Some variability was noted across the various domains. His performance on measures of attention, processing speed and executive functioning were in the average range. Deficits were noted on measures of working memory and attention to visual detail task. His performance on measures assessing spatial and language function was variable. 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  Although he was within normal limits on a naming task, his verbal fluency was moderately below expectations. Clearly his greatest difficulties are concerning his memory. This includes this delayed recall of both visual and verbal information. He demonstrated an adequate level of cognitive flexibility. DIAGNOSTIC IMPRESSIONS:    ICD-10-CM ICD-9-CM    1. 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 Findings should be reviewed with Mr. Chowdary to insure her understanding and discuss the potential implications. 2. Emphasis should be placed on Mr. Jaky Mon obtaining good sleep hygiene and maintaining adequate physical exercise to promote good brain health. 3. Mr. Jaky Mon may benefit from a referral to psychotherapy to address anxiety and pending health related changes. Dr. Margarette Fernandez is a good option for receiving care. 4. Mr. Jaky Mon is encouraged to seek out various forms of mental stimulation that would help to \"exercise\" her brain. This might include learning a new skill, hobby, travel, attending lectures, or evening reading or listening to podcasts or videos on topics of her interest.  5. Mr. Jaky Mon would benefit from cognitive training exercises that are available on his smartphone or the computer.    6. Mr. Jaky Mon should consider talking with his physician about medications available to slow the loss of his memory. Thank you for allowing me the opportunity to assist you in Mr. Chowdary's care. Please do not hesitate to contact me should you have additional questions that I may not have addressed. 39746 x 1  96138 x 1  96139 x 6  96132 x 1  96133 x 2          Richard Fernando, Ph.D., ABPP  Licensed Clinical Psychologist  Neuropsychologist  Board Certified Rehabilitation Psychologist      Time Documentation:     29901 x 1: Neurobehavioral Status Exam/Clinical Interview: (1 hour, (already billed on first date of service)     26625*7 Neuropsych testing/data gathering by Neuropsychologist (35 additional minutes, see above)      96138 x 1  96139 x 6 Test Administration/Data Gathering By Technician: (3.5 hours). 72843 x 1 (first 30 minutes), 48310 x 7 (each additional 30 minutes)     96132 x 1  96133 x 1 Testing Evaluation Services by Neuropsychologist (1 hour, 50 minutes) 96132 x 1 (first hour), 96133 x 1 (50 minutes)     Definitions:       25955/88058:  Neurobehavioral Status Exam, Clinical interview. Clinical assessment of thinking, reasoning and judgment, by neuropsychologist, both face to face time with patient and time interpreting those test results and reporting, first and subsequent hours)     73521/82542: Neuropsychological Test Administration by Technician/Psychometrist, first 30 minutes and each additional 30 minutes. The above includes: Record review. Review of history provided by patient. Review of collaborative information. Testing by Clinician. Review of raw data. Scoring. Report writing of individual tests administered by Clinician. Integration of individual tests administered by psychometrist with NSE/testing by clinician, review of records/history/collaborative information, case Conceptualization, treatment planning, clinical decision making, report writing, coordination Of Care.  Psychometry test codes as time spent by psychometrist administering and scoring neurocognitive/psychological tests under supervision of neuropsychologist.  Integral services including scoring of raw data, data interpretation, case conceptualization, report writing etcetera were initiated after the patient finished testing/raw data collected and was completed on the date the report was signed. This note will not be viewable in [address]

## 2019-07-01 ENCOUNTER — TELEPHONE (OUTPATIENT)
Dept: NEUROLOGY | Age: 79
End: 2019-07-01

## 2019-07-01 NOTE — TELEPHONE ENCOUNTER
----- Message from Shashi Rizo 2906 sent at 7/1/2019 11:36 AM EDT -----  Regarding: Dr Owen Vulcan telephone  The pt would like a callback to move his October appt to something sooner to receive his test results from seeing Dr Donald Jones.     Best contact number is [phone]

## 2019-07-05 ENCOUNTER — TELEPHONE (OUTPATIENT)
Dept: NEUROLOGY | Age: 79
End: 2019-07-05

## 2019-07-30 ENCOUNTER — TELEPHONE (OUTPATIENT)
Dept: NEUROLOGY | Age: 79
End: 2019-07-30

## 2019-07-31 ENCOUNTER — OFFICE VISIT (OUTPATIENT)
Dept: NEUROLOGY | Age: 79
End: 2019-07-31

## 2019-07-31 DIAGNOSIS — F03.90 MAJOR NEUROCOGNITIVE DISORDER (HCC): Primary | ICD-10-CM

## 2019-07-31 NOTE — PROGRESS NOTES
This note will not be viewable in [address] Yordy Garcia is a 70 y.o. male who presents today for feedback following recent neuropsychological testing. The results were reviewed of the recent neuropsychological evaluation, including discussing individual tests as well as the patient's areas of neurocognitive strength versus their weaknesses. Education was provided regarding my diagnostic impressions, and we discussed next steps for further evaluation down the road. I all also answered numerous questions related to the clinical findings, including discussing various methods to improve cognitive cognition and mood when relevant. The patient is encouraged to follow-up with the referring provider. DIAGNOSTIC IMPRESSIONS:    ICD-10-CM ICD-9-CM    1.  Major neurocognitive disorder F01.50 294.20        66751 30 minutes x 1    Rosa Elena Willett, PHD

## 2019-08-08 DIAGNOSIS — G20 PARKINSON'S DISEASE (HCC): ICD-10-CM

## 2019-08-08 NOTE — TELEPHONE ENCOUNTER
Future Appointments   Date Time Provider Carina Annel   10/30/2019 11:40 AM Jaxon Weems MD [address] [place] [address]                         Last Appointment My Department:  7/31/2019    Would you like to refill below? Requested Prescriptions     Pending Prescriptions Disp Refills    carbidopa-levodopa (SINEMET)  mg per tablet 90 Tab 3     Sig: Take 1 Tab by mouth three (3) times daily.

## 2019-08-16 RX ORDER — CARBIDOPA AND LEVODOPA 25; 100 MG/1; MG/1
1 TABLET ORAL 3 TIMES DAILY
Qty: 90 TAB | Refills: 3 | Status: SHIPPED | OUTPATIENT
Start: 2019-08-16 | End: 2020-02-14

## 2019-10-30 ENCOUNTER — OFFICE VISIT (OUTPATIENT)
Dept: NEUROLOGY | Age: 79
End: 2019-10-30

## 2019-10-30 VITALS
SYSTOLIC BLOOD PRESSURE: 130 MMHG | HEIGHT: 72 IN | OXYGEN SATURATION: 98 % | BODY MASS INDEX: 26.18 KG/M2 | DIASTOLIC BLOOD PRESSURE: 64 MMHG | HEART RATE: 66 BPM

## 2019-10-30 DIAGNOSIS — D47.2 MGUS (MONOCLONAL GAMMOPATHY OF UNKNOWN SIGNIFICANCE): ICD-10-CM

## 2019-10-30 DIAGNOSIS — R41.3 MEMORY LOSS: Primary | ICD-10-CM

## 2019-10-30 DIAGNOSIS — R26.9 GAIT DISTURBANCE: ICD-10-CM

## 2019-10-30 DIAGNOSIS — G60.3 IDIOPATHIC PROGRESSIVE NEUROPATHY: ICD-10-CM

## 2019-10-30 RX ORDER — RIVASTIGMINE TARTRATE 1.5 MG/1
1.5 CAPSULE ORAL 2 TIMES DAILY WITH MEALS
Qty: 60 CAP | Refills: 4 | Status: SHIPPED | OUTPATIENT
Start: 2019-10-30 | End: 2020-01-31 | Stop reason: DRUGHIGH

## 2019-10-30 NOTE — PROGRESS NOTES
Name: Teresa Pollard    Chief Complaint: neuropathy    He comes for follow up visit. He has new hand controls for his car. He has right leg pain and weakness. He has been working out in the yard for the season. He is suing metanx and carbidopa. And is compliant with treatment,     Getting hand held controls for the car. He has had trigger finger surgery on the left. He has been compliant with his medications. He finds that his walking improves with Sinemet. He is not a stooped according to his wife. She would like him to continue taking the drug as it would he. He has suffered no falls. He is concerned about his memory. His wife states that he repeats himself will forget conversations we discussed different kinds of dementia including Parkinson's related dementia as well as Alzheimer's. We agreed that because of his high functioning level a neuropsych examination would be preferable at least serve as a baseline should further concern arise. Assesment and Plan    1. Idiopathic progressive neuropathy  Continue metanx  Retest for MGUS next January  His walking is off will rest until he is seen in four months at which point test mgus and if still waling poorly will repeat the EMG    2. Vitamin B12 deficiency  Started Metanx      3. Type 2 diabetes mellitus with complication, without long-term current use of insulin ([facility])  contineu insulin     4. Possible Parkinson's  Symptoms have improved  Continue Sinemet    5. Vitamin D Deficiency  Continue vitamin D    5. Memory loss  Mild neurocognitive impariment  Will start on low dose exelon      Allergies  Patient has no known allergies. Medications  Current Outpatient Medications   Medication Sig    carbidopa-levodopa (SINEMET)  mg per tablet Take 1 Tab by mouth three (3) times daily.  L-Mfolate-B6 Phos-Methyl-B12 (METANX) 3-35-2 mg tab tab Take 1 Tab by mouth daily.     cholecalciferol, vitamin D3, (VITAMIN D3) 4,000 unit cap Take 1 Cap by mouth daily. Indications: VITAMIN D DEFICIENCY    desmopressin (DDAVP NASAL) 10 mcg/spray (0.1 mL) solution USE 1 SPRAY AS DIRECTED AT BEDTIME (NEED TO BE SEEN)    valsartan-hydrochlorothiazide (DIOVAN-HCT) 160-12.5 mg per tablet Take 1 tablet by mouth daily.  atorvastatin (LIPITOR) 40 mg tablet Take 40 mg by mouth daily.  cyanocobalamin (VITAMIN B-12) 1,000 mcg/mL injection Intramuscular injection of 1000 mcg every week for 4 weeks then every month  Indications: VITAMIN B12 DEFICIENCY    Syringe-Needle, Safety,Disp Un 3 mL 25 gauge x 1\" syrg Use as directed     No current facility-administered medications for this visit. Medical History  Past Medical History:   Diagnosis Date    Diabetes ([facility] [address]     Hypertension     Ill-defined condition     high cholesterol     Review of Systems   Constitutional: Negative for chills and fever. HENT: Negative for ear pain. Eyes: Negative for pain and discharge. Respiratory: Negative for cough and hemoptysis. Cardiovascular: Negative for chest pain and claudication. Gastrointestinal: Negative for constipation and diarrhea. Genitourinary: Negative for flank pain and hematuria. Musculoskeletal: Negative for back pain and myalgias. Skin: Negative for itching and rash. Neurological: Positive for sensory change. Negative for headaches. Endo/Heme/Allergies: Negative for environmental allergies. Does not bruise/bleed easily. Psychiatric/Behavioral: Negative for depression and hallucinations. Exam:    Visit Vitals  /64   Pulse 66   Ht 6' (1.829 m)   SpO2 98%   BMI 26.18 kg/m²      General: Well developed, well nourished. Patient in no apparent distress   Head: Normocephalic, atraumatic, anicteric sclera   Neck Normal ROM, No thyromegally   Lungs:  Clear to auscultation bilaterally, No wheezes or rubs   Cardiac: Regular rate and rhythm with no murmurs. Abd: Bowel sounds were audible.  No tenderness on palpation   Ext: No pedal edema Skin: No overt signs of rash or insect bites       NeurologicExam:  Mental Status: Alert and oriented to person place and time   Speech: Fluent no aphasia or dysarthria. Cranial Nerves:  II - XII Inact   Motor:  Right iliopsoas weakness. And symmetric weakness of small muscles of the feet. Right peroneal weakness   Reflexes:   +1/4 over the proximal tendons of the upper and lower extremities. +0/4 over distal tendons. Sensory:   Symmetric distal reduction in sensory perception affecting all modalities   Gait:  Wide based, shuffling. Tremor:   No tremor noted. Cerebellar:  Coordination intact. Neurovascular: No carotid bruits. No JVD       Imaging      MRI Results (most recent):  Results from [place] encounter on 10/24/17   MRI BRAIN WO CONT    Narrative Indication: Normal pressure hydrocephalus. Memory loss. COMPARISON: None. TECHNIQUE: Multiplanar, multisequence noncontrast brain MRI per standard  protocol. FINDINGS: The structures in the posterior fossa and supratentorial space are  developmentally normal. There are mild global age-related involutional changes  with mild prominence of the sulci and CSF-containing structures. Mild scattered  areas of T2/FLAIR hyperintense signal the periventricular deep white matter most  compatible with sequela of chronic microvascular ischemic disease. There are no  findings to support a diagnosis of normal pressure hydrocephalus. Specifically,  ventricular system is nondilated out of proportion to the degree of sulcal  prominence. No restricted diffusion to suggest acute ischemia. The orbital  contents appear normal. The pneumatized portions of the skull are clear. Impression IMPRESSION:  1. No acute intracranial findings. No MRI findings to suggest normal pressure  hydrocephalus. 2. Mild age-related involutional changes and mild chronic vascular ischemic  changes.          .  Lab Review    Lab Results   Component Value Date/Time    WBC 6.4 07/01/2009 12:25 PM    HCT 43.4 07/01/2009 12:25 PM    HGB 15.0 07/01/2009 12:25 PM    PLATELET 313 05/76/1146 12:25 PM       Lab Results   Component Value Date/Time    Sodium 138 07/01/2009 12:25 PM    Potassium 3.8 07/01/2009 12:25 PM    Chloride 103 07/01/2009 12:25 PM    CO2 29 07/01/2009 12:25 PM    Glucose 99 07/01/2009 12:25 PM    BUN 15 07/01/2009 12:25 PM    Creatinine 1.0 07/01/2009 12:25 PM    Calcium 8.7 07/01/2009 12:25 PM         Lab Results   Component Value Date/Time    Vitamin B12 >2000 (H) 01/22/2019 01:17 PM    Folate 13.4 09/12/2017 01:11 PM

## 2019-10-30 NOTE — PATIENT INSTRUCTIONS
A Healthy Lifestyle: Care Instructions  Your Care Instructions    A healthy lifestyle can help you feel good, stay at a healthy weight, and have plenty of energy for both work and play. A healthy lifestyle is something you can share with your whole family. A healthy lifestyle also can lower your risk for serious health problems, such as high blood pressure, heart disease, and diabetes. You can follow a few steps listed below to improve your health and the health of your family. Follow-up care is a key part of your treatment and safety. Be sure to make and go to all appointments, and call your doctor if you are having problems. It's also a good idea to know your test results and keep a list of the medicines you take. How can you care for yourself at home? · Do not eat too much sugar, fat, or fast foods. You can still have dessert and treats now and then. The goal is moderation. · Start small to improve your eating habits. Pay attention to portion sizes, drink less juice and soda pop, and eat more fruits and vegetables. ? Eat a healthy amount of food. A 3-ounce serving of meat, for example, is about the size of a deck of cards. Fill the rest of your plate with vegetables and whole grains. ? Limit the amount of soda and sports drinks you have every day. Drink more water when you are thirsty. ? Eat at least 5 servings of fruits and vegetables every day. It may seem like a lot, but it is not hard to reach this goal. A serving or helping is 1 piece of fruit, 1 cup of vegetables, or 2 cups of leafy, raw vegetables. Have an apple or some carrot sticks as an afternoon snack instead of a candy bar. Try to have fruits and/or vegetables at every meal.  · Make exercise part of your daily routine. You may want to start with simple activities, such as walking, bicycling, or slow swimming. Try to be active 30 to 60 minutes every day. You do not need to do all 30 to 60 minutes all at once.  For example, you can exercise 3 times a day for 10 or 20 minutes. Moderate exercise is safe for most people, but it is always a good idea to talk to your doctor before starting an exercise program.  · Keep moving. Venus Camilo the lawn, work in the garden, or [employer]. Take the stairs instead of the elevator at work. · If you smoke, quit. People who smoke have an increased risk for heart attack, stroke, cancer, and other lung illnesses. Quitting is hard, but there are ways to boost your chance of quitting tobacco for good. ? Use nicotine gum, patches, or lozenges. ? Ask your doctor about stop-smoking programs and medicines. ? Keep trying. In addition to reducing your risk of diseases in the future, you will notice some benefits soon after you stop using tobacco. If you have shortness of breath or asthma symptoms, they will likely get better within a few weeks after you quit. · Limit how much alcohol you drink. Moderate amounts of alcohol (up to 2 drinks a day for men, 1 drink a day for women) are okay. But drinking too much can lead to liver problems, high blood pressure, and other health problems. Family health  If you have a family, there are many things you can do together to improve your health. · Eat meals together as a family as often as possible. · Eat healthy foods. This includes fruits, vegetables, lean meats and dairy, and whole grains. · Include your family in your fitness plan. Most people think of activities such as jogging or tennis as the way to fitness, but there are many ways you and your family can be more active. Anything that makes you breathe hard and gets your heart pumping is exercise. Here are some tips:  ? Walk to do errands or to take your child to school or the bus.  ? Go for a family bike ride after dinner instead of watching TV. Where can you learn more? Go to http://hilario-toy.info/. Enter V642 in the search box to learn more about \"A Healthy Lifestyle: Care Instructions. \"  Current as of: May 28, 2019  Content Version: 12.2  © 1694-6777 [employer]. Care instructions adapted under license by [employer] (which disclaims liability or warranty for this information). If you have questions about a medical condition or this instruction, always ask your healthcare professional. [address] any warranty or liability for your use of this information.       exelon

## 2020-01-13 DIAGNOSIS — G60.3 IDIOPATHIC PROGRESSIVE NEUROPATHY: ICD-10-CM

## 2020-01-13 NOTE — TELEPHONE ENCOUNTER
Patient needs a rx refill for metanx sent to [facility] pharmacy / phone number [phone].

## 2020-01-13 NOTE — TELEPHONE ENCOUNTER
Patient is requesting a refill for    Requested Prescriptions     Pending Prescriptions Disp Refills    L-Mfolate-B6 Phos-Methyl-B12 (METANX) 3-35-2 mg tab tab 90 Tab 3     Sig: Take 1 Tab by mouth daily.      Future Appointments   Date Time Provider Carina Up   1/31/2020 11:20 AM Annelise Cuellar MD [facility] [id number]                         Last Appointment My Department:  10/30/2019    Please review

## 2020-01-31 ENCOUNTER — OFFICE VISIT (OUTPATIENT)
Dept: NEUROLOGY | Age: 80
End: 2020-01-31

## 2020-01-31 VITALS
BODY MASS INDEX: 26.14 KG/M2 | HEIGHT: 72 IN | WEIGHT: 193 LBS | DIASTOLIC BLOOD PRESSURE: 80 MMHG | SYSTOLIC BLOOD PRESSURE: 116 MMHG

## 2020-01-31 DIAGNOSIS — G31.84 MILD NEUROCOGNITIVE DISORDER: ICD-10-CM

## 2020-01-31 DIAGNOSIS — R26.9 GAIT DISTURBANCE: ICD-10-CM

## 2020-01-31 DIAGNOSIS — R41.3 MEMORY LOSS: ICD-10-CM

## 2020-01-31 DIAGNOSIS — G20 PARKINSON'S DISEASE (HCC): ICD-10-CM

## 2020-01-31 DIAGNOSIS — D47.2 MGUS (MONOCLONAL GAMMOPATHY OF UNKNOWN SIGNIFICANCE): Primary | ICD-10-CM

## 2020-01-31 RX ORDER — RIVASTIGMINE TARTRATE 3 MG/1
3 CAPSULE ORAL 2 TIMES DAILY WITH MEALS
Qty: 60 CAP | Refills: 3 | Status: SHIPPED | OUTPATIENT
Start: 2020-01-31 | End: 2020-02-24 | Stop reason: SDUPTHER

## 2020-01-31 NOTE — PATIENT INSTRUCTIONS
Increase exelon to 3mg twice daily Continue metanx for memory Contineu sinemet 25/100 three times daily Do lab work

## 2020-01-31 NOTE — PROGRESS NOTES
Name: Bailey Martin    Chief Complaint: neuropathy    Patient comes for a follow up visit. He has not had any falls. He has been compliant with his medications. His wife was diagnosed with viral pneumonia and she was hospitalized for four days and she is currently at home. This has been a stress for him. He has adapted to the hand controls of the car. Today he is worried about his memory. He is able to pay his bills on time but he has trouble with trivial and recent events like things that happen on tv. He comes for follow up visit. He has new hand controls for his car. He has right leg pain and weakness. He has been working out in the yard for the season. He is suing metanx and carbidopa. And is compliant with treatment,     Getting hand held controls for the car. He has had trigger finger surgery on the left. He has been compliant with his medications. He finds that his walking improves with Sinemet. He is not a stooped according to his wife. She would like him to continue taking the drug as it would he. He has suffered no falls. He is concerned about his memory. His wife states that he repeats himself will forget conversations we discussed different kinds of dementia including Parkinson's related dementia as well as Alzheimer's. We agreed that because of his high functioning level a neuropsych examination would be preferable at least serve as a baseline should further concern arise. Assesment and Plan    1. Idiopathic progressive neuropathy  Continue metanx  Retest for MGUS next January  His walking is off will rest until he is seen in four months at which point test mgus and if still waling poorly will repeat the EMG    2. Vitamin B12 deficiency  Started Metanx    3. Type 2 diabetes mellitus with complication, without long-term current use of insulin ([facility])  contineu insulin     4. Possible Parkinson's  Symptoms have improved  Continue Sinemet    5.  Vitamin D Deficiency  Continue vitamin D    5. Memory loss  Mild neurocognitive impariment  Increase exelon to 3mg bid      Allergies  Patient has no known allergies. Medications  Current Outpatient Medications   Medication Sig    rivaroxaban (XARELTO) 10 mg tablet Take  by mouth daily.  L-Mfolate-B6 Phos-Methyl-B12 (METANX) 3-35-2 mg tab tab Take 1 Tab by mouth daily.  rivastigmine tartrate (EXELON) 1.5 mg capsule Take 1 Cap by mouth two (2) times daily (with meals).  carbidopa-levodopa (SINEMET)  mg per tablet Take 1 Tab by mouth three (3) times daily.  cyanocobalamin (VITAMIN B-12) 1,000 mcg/mL injection Intramuscular injection of 1000 mcg every week for 4 weeks then every month  Indications: VITAMIN B12 DEFICIENCY    Syringe-Needle, Safety,Disp Un 3 mL 25 gauge x 1\" syrg Use as directed    cholecalciferol, vitamin D3, (VITAMIN D3) 4,000 unit cap Take 1 Cap by mouth daily. Indications: VITAMIN D DEFICIENCY    valsartan-hydrochlorothiazide (DIOVAN-HCT) 160-12.5 mg per tablet Take 1 tablet by mouth daily.  atorvastatin (LIPITOR) 40 mg tablet Take 40 mg by mouth daily.  desmopressin (DDAVP NASAL) 10 mcg/spray (0.1 mL) solution USE 1 SPRAY AS DIRECTED AT BEDTIME (NEED TO BE SEEN)     No current facility-administered medications for this visit. Medical History  Past Medical History:   Diagnosis Date    Diabetes ([facility] [address]     Hypertension     Ill-defined condition     high cholesterol     Review of Systems   Constitutional: Negative for chills and fever. HENT: Negative for ear pain. Eyes: Negative for pain and discharge. Respiratory: Negative for cough and hemoptysis. Cardiovascular: Negative for chest pain and claudication. Gastrointestinal: Negative for constipation and diarrhea. Genitourinary: Negative for flank pain and hematuria. Musculoskeletal: Negative for back pain and myalgias. Skin: Negative for itching and rash. Neurological: Positive for sensory change. Negative for headaches. Endo/Heme/Allergies: Negative for environmental allergies. Does not bruise/bleed easily. Psychiatric/Behavioral: Negative for depression and hallucinations. Exam:    Visit Vitals  /80   Ht 6' (1.829 m)   Wt 193 lb (87.5 kg)   BMI 26.18 kg/m²      General: Well developed, well nourished. Patient in no apparent distress   Head: Normocephalic, atraumatic, anicteric sclera   Neck Normal ROM, No thyromegally   Lungs:  Clear to auscultation bilaterally, No wheezes or rubs   Cardiac: Regular rate and rhythm with no murmurs. Abd: Bowel sounds were audible. No tenderness on palpation   Ext: No pedal edema   Skin: No overt signs of rash or insect bites       NeurologicExam:  Mental Status: Alert and oriented to person place and time   Speech: Fluent no aphasia or dysarthria. Cranial Nerves:  II - XII Inact   Motor:  Right iliopsoas weakness. And symmetric weakness of small muscles of the feet. Right peroneal weakness   Reflexes:   +1/4 over the proximal tendons of the upper and lower extremities. +0/4 over distal tendons. Sensory:   Symmetric distal reduction in sensory perception affecting all modalities   Gait:  Wide based, shuffling. Tremor:   No tremor noted. Cerebellar:  Coordination intact. Neurovascular: No carotid bruits. No JVD       Imaging      MRI Results (most recent):  Results from [place] encounter on 10/24/17   MRI BRAIN WO CONT    Narrative Indication: Normal pressure hydrocephalus. Memory loss. COMPARISON: None. TECHNIQUE: Multiplanar, multisequence noncontrast brain MRI per standard  protocol. FINDINGS: The structures in the posterior fossa and supratentorial space are  developmentally normal. There are mild global age-related involutional changes  with mild prominence of the sulci and CSF-containing structures.  Mild scattered  areas of T2/FLAIR hyperintense signal the periventricular deep white matter most  compatible with sequela of chronic microvascular ischemic disease. There are no  findings to support a diagnosis of normal pressure hydrocephalus. Specifically,  ventricular system is nondilated out of proportion to the degree of sulcal  prominence. No restricted diffusion to suggest acute ischemia. The orbital  contents appear normal. The pneumatized portions of the skull are clear. Impression IMPRESSION:  1. No acute intracranial findings. No MRI findings to suggest normal pressure  hydrocephalus. 2. Mild age-related involutional changes and mild chronic vascular ischemic  changes.          .  Lab Review    Lab Results   Component Value Date/Time    WBC 6.4 07/01/2009 12:25 PM    HCT 43.4 07/01/2009 12:25 PM    HGB 15.0 07/01/2009 12:25 PM    PLATELET 557 76/42/7408 12:25 PM       Lab Results   Component Value Date/Time    Sodium 138 07/01/2009 12:25 PM    Potassium 3.8 07/01/2009 12:25 PM    Chloride 103 07/01/2009 12:25 PM    CO2 29 07/01/2009 12:25 PM    Glucose 99 07/01/2009 12:25 PM    BUN 15 07/01/2009 12:25 PM    Creatinine 1.0 07/01/2009 12:25 PM    Calcium 8.7 07/01/2009 12:25 PM         Lab Results   Component Value Date/Time    Vitamin B12 >2000 (H) 01/22/2019 01:17 PM    Folate 13.4 09/12/2017 01:11 PM

## 2020-02-06 LAB — SPECIMEN STATUS REPORT, ROLRST: NORMAL

## 2020-02-07 LAB
ALBUMIN SERPL ELPH-MCNC: 3.8 G/DL (ref 2.9–4.4)
ALBUMIN/GLOB SERPL: 1.4 {RATIO} (ref 0.7–1.7)
ALPHA1 GLOB SERPL ELPH-MCNC: 0.2 G/DL (ref 0–0.4)
ALPHA2 GLOB SERPL ELPH-MCNC: 0.6 G/DL (ref 0.4–1)
B-GLOBULIN SERPL ELPH-MCNC: 1.3 G/DL (ref 0.7–1.3)
GAMMA GLOB SERPL ELPH-MCNC: 0.7 G/DL (ref 0.4–1.8)
GLOBULIN SER CALC-MCNC: 2.8 G/DL (ref 2.2–3.9)
M PROTEIN SERPL ELPH-MCNC: 0.4 G/DL
PLEASE NOTE, 011150: ABNORMAL
PROT SERPL-MCNC: 6.6 G/DL (ref 6–8.5)

## 2020-02-14 DIAGNOSIS — G20 PARKINSON'S DISEASE (HCC): ICD-10-CM

## 2020-02-14 RX ORDER — CARBIDOPA AND LEVODOPA 25; 100 MG/1; MG/1
TABLET ORAL
Qty: 90 TAB | Refills: 3 | Status: SHIPPED | OUTPATIENT
Start: 2020-02-14 | End: 2020-05-12

## 2020-02-21 ENCOUNTER — TELEPHONE (OUTPATIENT)
Dept: NEUROLOGY | Age: 80
End: 2020-02-21

## 2020-02-21 NOTE — TELEPHONE ENCOUNTER
Patient would like to have a call back to discuss the direction on his rxs that was given to him on 1/31

## 2020-02-24 ENCOUNTER — TELEPHONE (OUTPATIENT)
Dept: NEUROLOGY | Age: 80
End: 2020-02-24

## 2020-02-24 DIAGNOSIS — R41.3 MEMORY LOSS: ICD-10-CM

## 2020-02-24 DIAGNOSIS — G31.84 MILD NEUROCOGNITIVE DISORDER: ICD-10-CM

## 2020-02-24 RX ORDER — RIVASTIGMINE TARTRATE 3 MG/1
3 CAPSULE ORAL 2 TIMES DAILY WITH MEALS
Qty: 60 CAP | Refills: 3 | Status: SHIPPED | OUTPATIENT
Start: 2020-02-24 | End: 2020-05-21 | Stop reason: SDUPTHER

## 2020-02-24 NOTE — TELEPHONE ENCOUNTER
Spoke with patient and he had questions about how to take Exelon and also stated that his [facility] doesn't carry the medication and requested it get sent to his local [facility] pharmacy. Medication was sent and directions were explained.

## 2020-05-10 DIAGNOSIS — G20 PARKINSON'S DISEASE (HCC): ICD-10-CM

## 2020-05-12 RX ORDER — CARBIDOPA AND LEVODOPA 25; 100 MG/1; MG/1
TABLET ORAL
Qty: 270 TAB | Refills: 1 | Status: SHIPPED | OUTPATIENT
Start: 2020-05-12 | End: 2021-09-24

## 2020-05-21 DIAGNOSIS — R41.3 MEMORY LOSS: ICD-10-CM

## 2020-05-21 DIAGNOSIS — G31.84 MILD NEUROCOGNITIVE DISORDER: ICD-10-CM

## 2020-05-21 NOTE — TELEPHONE ENCOUNTER
Received refill request for rivastigmine   From [facility] pharmacy  Last filled 2/24/20   Last seen in office 1/31/20 by Jerry Macedo

## 2020-05-22 RX ORDER — RIVASTIGMINE TARTRATE 3 MG/1
3 CAPSULE ORAL 2 TIMES DAILY WITH MEALS
Qty: 180 CAP | Refills: 3 | Status: SHIPPED | OUTPATIENT
Start: 2020-05-22 | End: 2021-09-24

## 2021-01-21 DIAGNOSIS — G60.3 IDIOPATHIC PROGRESSIVE NEUROPATHY: ICD-10-CM

## 2021-01-22 ENCOUNTER — TRANSCRIBE ORDER (OUTPATIENT)
Dept: SCHEDULING | Age: 81
End: 2021-01-22

## 2021-01-22 DIAGNOSIS — R13.12 OROPHARYNGEAL DYSPHAGIA: Primary | ICD-10-CM

## 2021-03-23 ENCOUNTER — OFFICE VISIT (OUTPATIENT)
Dept: NEUROLOGY | Age: 81
End: 2021-03-23
Payer: MEDICARE

## 2021-03-23 VITALS
DIASTOLIC BLOOD PRESSURE: 60 MMHG | HEART RATE: 100 BPM | TEMPERATURE: 97.3 F | RESPIRATION RATE: 16 BRPM | SYSTOLIC BLOOD PRESSURE: 118 MMHG | BODY MASS INDEX: 24.87 KG/M2 | OXYGEN SATURATION: 98 % | HEIGHT: 72 IN | WEIGHT: 183.6 LBS

## 2021-03-23 DIAGNOSIS — G31.84 MILD NEUROCOGNITIVE DISORDER: ICD-10-CM

## 2021-03-23 DIAGNOSIS — G20 PARKINSON'S DISEASE (HCC): Primary | ICD-10-CM

## 2021-03-23 DIAGNOSIS — G60.3 IDIOPATHIC PROGRESSIVE NEUROPATHY: ICD-10-CM

## 2021-03-23 PROCEDURE — G8427 DOCREV CUR MEDS BY ELIG CLIN: HCPCS | Performed by: PSYCHIATRY & NEUROLOGY

## 2021-03-23 PROCEDURE — G8420 CALC BMI NORM PARAMETERS: HCPCS | Performed by: PSYCHIATRY & NEUROLOGY

## 2021-03-23 PROCEDURE — G8432 DEP SCR NOT DOC, RNG: HCPCS | Performed by: PSYCHIATRY & NEUROLOGY

## 2021-03-23 PROCEDURE — G8536 NO DOC ELDER MAL SCRN: HCPCS | Performed by: PSYCHIATRY & NEUROLOGY

## 2021-03-23 PROCEDURE — 1101F PT FALLS ASSESS-DOCD LE1/YR: CPT | Performed by: PSYCHIATRY & NEUROLOGY

## 2021-03-23 PROCEDURE — 99215 OFFICE O/P EST HI 40 MIN: CPT | Performed by: PSYCHIATRY & NEUROLOGY

## 2021-03-23 RX ORDER — ROPINIROLE 2 MG/1
2 TABLET, FILM COATED, EXTENDED RELEASE ORAL DAILY
Qty: 30 TAB | Refills: 3 | Status: SHIPPED | OUTPATIENT
Start: 2021-03-23 | End: 2021-07-13

## 2021-03-23 NOTE — PROGRESS NOTES
Name: Neto Whitman    Chief Complaint: neuropathy    Patient has not had any falls. He is complaint with his medications. His wife was hospitalized after a stroke and she fell and broke her left wrist. His memory does seem to be getting worse. He is worried about his wife. He is able to pay his bills. He remains active physically. He is avoiding the sinemet because he is cutting down on the medications and he feels it made him nauseous. He admits to having trouble taking it three times a day. He requests a medications that he can take once a day. He comes for follow up visit. He has new hand controls for his car. He has right leg pain and weakness. He has been working out in the yard for the season. He is suing metanx and carbidopa. And is compliant with treatment,             Assesment and Plan    1. Idiopathic progressive neuropathy  Continue metanx    2. Type 2 diabetes mellitus with complication, without long-term current use of insulin ([facility])  continue insulin     4. Possible Parkinson's  Sinemet causes nausea and he cant remember to take it three times a day  Trial of requip    5. Vitamin D Deficiency  Continue vitamin D    5. Memory loss  continue exelon      Allergies  Patient has no known allergies. Medications  Current Outpatient Medications   Medication Sig    L-Mfolate-B6 Phos-Methyl-B12 (METANX) 3-35-2 mg tab tab Take 1 Tab by mouth daily.  rivastigmine tartrate (EXELON) 3 mg capsule Take 1 Cap by mouth two (2) times daily (with meals).  carbidopa-levodopa (SINEMET)  mg per tablet TAKE 1 TABLET BY MOUTH THREE TIMES A DAY    rivaroxaban (XARELTO) 10 mg tablet Take  by mouth daily.     cyanocobalamin (VITAMIN B-12) 1,000 mcg/mL injection Intramuscular injection of 1000 mcg every week for 4 weeks then every month  Indications: VITAMIN B12 DEFICIENCY    Syringe-Needle, Safety,Disp Un 3 mL 25 gauge x 1\" syrg Use as directed    cholecalciferol, vitamin D3, (VITAMIN D3) 4,000 unit cap Take 1 Cap by mouth daily. Indications: VITAMIN D DEFICIENCY    desmopressin (DDAVP NASAL) 10 mcg/spray (0.1 mL) solution USE 1 SPRAY AS DIRECTED AT BEDTIME (NEED TO BE SEEN)    valsartan-hydrochlorothiazide (DIOVAN-HCT) 160-12.5 mg per tablet Take 1 tablet by mouth daily.  atorvastatin (LIPITOR) 40 mg tablet Take 40 mg by mouth daily. No current facility-administered medications for this visit. Medical History  Past Medical History:   Diagnosis Date    Diabetes ([facility] [address]     Hypertension     Ill-defined condition     high cholesterol     Review of Systems   Constitutional: Negative for chills and fever. HENT: Negative for ear pain. Eyes: Negative for pain and discharge. Respiratory: Negative for cough and hemoptysis. Cardiovascular: Negative for chest pain and claudication. Gastrointestinal: Negative for constipation and diarrhea. Genitourinary: Negative for flank pain and hematuria. Musculoskeletal: Negative for back pain and myalgias. Skin: Negative for itching and rash. Neurological: Positive for sensory change. Negative for headaches. Endo/Heme/Allergies: Negative for environmental allergies. Does not bruise/bleed easily. Psychiatric/Behavioral: Negative for depression and hallucinations. Exam:    Visit Vitals  /60 (BP 1 Location: Left arm, BP Patient Position: Sitting, BP Cuff Size: Adult)   Pulse 100   Temp 97.3 °F (36.3 °C) (Temporal)   Resp 16   Ht 6' (1.829 m)   Wt 183 lb 9.6 oz (83.3 kg)   SpO2 98%   BMI 24.90 kg/m²      General: Well developed, well nourished. Patient in no apparent distress   Head: Normocephalic, atraumatic, anicteric sclera   Neck Normal ROM, No thyromegally   Lungs:  Clear to auscultation bilaterally, No wheezes or rubs   Cardiac: Regular rate and rhythm with no murmurs. Abd: Bowel sounds were audible.  No tenderness on palpation   Ext: No pedal edema   Skin: No overt signs of rash or insect bites     NeurologicExam:  Mental Status: Alert and oriented to person place and time   Speech: Fluent no aphasia or dysarthria. Cranial Nerves:  II - XII Inact   Motor:  Right iliopsoas weakness. And symmetric weakness of small muscles of the feet. Right peroneal weakness   Reflexes:   +1/4 over the proximal tendons of the upper and lower extremities. +0/4 over distal tendons. Sensory:   Symmetric distal reduction in sensory perception affecting all modalities   Gait:  Wide based, shuffling. Tremor:   No tremor noted. Cerebellar:  Coordination intact. Neurovascular: No carotid bruits. No JVD       Imaging      MRI Results (most recent):  Results from [place] encounter on 10/24/17   MRI BRAIN WO CONT    Narrative Indication: Normal pressure hydrocephalus. Memory loss. COMPARISON: None. TECHNIQUE: Multiplanar, multisequence noncontrast brain MRI per standard  protocol. FINDINGS: The structures in the posterior fossa and supratentorial space are  developmentally normal. There are mild global age-related involutional changes  with mild prominence of the sulci and CSF-containing structures. Mild scattered  areas of T2/FLAIR hyperintense signal the periventricular deep white matter most  compatible with sequela of chronic microvascular ischemic disease. There are no  findings to support a diagnosis of normal pressure hydrocephalus. Specifically,  ventricular system is nondilated out of proportion to the degree of sulcal  prominence. No restricted diffusion to suggest acute ischemia. The orbital  contents appear normal. The pneumatized portions of the skull are clear. Impression IMPRESSION:  1. No acute intracranial findings. No MRI findings to suggest normal pressure  hydrocephalus. 2. Mild age-related involutional changes and mild chronic vascular ischemic  changes.            Lab Review    Lab Results   Component Value Date/Time    WBC 6.4 07/01/2009 12:25 PM    HCT 43.4 07/01/2009 12:25 PM    HGB 15.0 07/01/2009 12:25 PM    PLATELET 893 32/22/3447 12:25 PM       Lab Results   Component Value Date/Time    Sodium 138 07/01/2009 12:25 PM    Potassium 3.8 07/01/2009 12:25 PM    Chloride 103 07/01/2009 12:25 PM    CO2 29 07/01/2009 12:25 PM    Glucose 99 07/01/2009 12:25 PM    BUN 15 07/01/2009 12:25 PM    Creatinine 1.0 07/01/2009 12:25 PM    Calcium 8.7 07/01/2009 12:25 PM         Lab Results   Component Value Date/Time    Vitamin B12 >2000 (H) 01/22/2019 01:17 PM    Folate 13.4 09/12/2017 01:11 PM

## 2021-06-01 ENCOUNTER — TRANSCRIBE ORDER (OUTPATIENT)
Dept: SCHEDULING | Age: 81
End: 2021-06-01

## 2021-06-01 DIAGNOSIS — R41.3 MEMORY LOSS: Primary | ICD-10-CM

## 2021-06-22 ENCOUNTER — HOSPITAL ENCOUNTER (OUTPATIENT)
Dept: MRI IMAGING | Age: 81
Discharge: HOME | End: 2021-06-22
Attending: FAMILY MEDICINE
Payer: MEDICARE

## 2021-06-22 DIAGNOSIS — R41.3 MEMORY LOSS: ICD-10-CM

## 2021-06-22 PROCEDURE — 70551 MRI BRAIN STEM W/O DYE: CPT

## 2021-07-13 RX ORDER — ROPINIROLE 2 MG/1
TABLET, FILM COATED, EXTENDED RELEASE ORAL
Qty: 90 TABLET | Refills: 1 | Status: SHIPPED | OUTPATIENT
Start: 2021-07-13 | End: 2021-10-20

## 2021-09-24 ENCOUNTER — OFFICE VISIT (OUTPATIENT)
Dept: NEUROLOGY | Age: 81
End: 2021-09-24
Payer: MEDICARE

## 2021-09-24 VITALS — RESPIRATION RATE: 16 BRPM | SYSTOLIC BLOOD PRESSURE: 112 MMHG | DIASTOLIC BLOOD PRESSURE: 72 MMHG

## 2021-09-24 DIAGNOSIS — G60.3 IDIOPATHIC PROGRESSIVE NEUROPATHY: ICD-10-CM

## 2021-09-24 DIAGNOSIS — D47.2 MGUS (MONOCLONAL GAMMOPATHY OF UNKNOWN SIGNIFICANCE): ICD-10-CM

## 2021-09-24 DIAGNOSIS — G31.84 MILD NEUROCOGNITIVE DISORDER: Primary | ICD-10-CM

## 2021-09-24 DIAGNOSIS — R41.3 MEMORY LOSS: ICD-10-CM

## 2021-09-24 PROCEDURE — G8420 CALC BMI NORM PARAMETERS: HCPCS | Performed by: PSYCHIATRY & NEUROLOGY

## 2021-09-24 PROCEDURE — G8510 SCR DEP NEG, NO PLAN REQD: HCPCS | Performed by: PSYCHIATRY & NEUROLOGY

## 2021-09-24 PROCEDURE — G8427 DOCREV CUR MEDS BY ELIG CLIN: HCPCS | Performed by: PSYCHIATRY & NEUROLOGY

## 2021-09-24 PROCEDURE — 1101F PT FALLS ASSESS-DOCD LE1/YR: CPT | Performed by: PSYCHIATRY & NEUROLOGY

## 2021-09-24 PROCEDURE — 99214 OFFICE O/P EST MOD 30 MIN: CPT | Performed by: PSYCHIATRY & NEUROLOGY

## 2021-09-24 PROCEDURE — G8536 NO DOC ELDER MAL SCRN: HCPCS | Performed by: PSYCHIATRY & NEUROLOGY

## 2021-09-24 RX ORDER — LACTULOSE 10 G/15ML
SOLUTION ORAL; RECTAL
COMMUNITY
Start: 2021-06-23 | End: 2022-07-12 | Stop reason: ALTCHOICE

## 2021-09-24 RX ORDER — DOXYCYCLINE 100 MG/1
CAPSULE ORAL
COMMUNITY
Start: 2021-09-21 | End: 2022-07-12 | Stop reason: ALTCHOICE

## 2021-09-24 RX ORDER — MIRTAZAPINE 15 MG/1
15 TABLET, FILM COATED ORAL
COMMUNITY
Start: 2021-09-21

## 2021-09-24 RX ORDER — FLUOROURACIL 50 MG/G
CREAM TOPICAL
COMMUNITY
Start: 2021-09-16 | End: 2022-07-12 | Stop reason: ALTCHOICE

## 2021-09-24 RX ORDER — SERTRALINE HYDROCHLORIDE 50 MG/1
50 TABLET, FILM COATED ORAL DAILY
COMMUNITY
Start: 2021-08-24 | End: 2021-09-24

## 2021-09-24 NOTE — PROGRESS NOTES
Chief Complaint   Patient presents with   Lindsey Oliver last week    Issues with constipation     Frequent urination     Lost 20 pounds without trying    Sleep issues, was only sleeping 3 hours - was put on medication that helped     Stated he is having memory issues- for example someone tells him something then he has to have them repeat it     Testicle infection- using Vaseline for this

## 2021-09-24 NOTE — PROGRESS NOTES
Name: Daniel Lai    Chief Complaint: neuropathy    He has been struggling with constipation and he has a testicular infection. He is getting doxycycline and is feeling better. 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 He continues to lose weight and is scheduled for a colonoscopy through his primary care physician. He is no longer taking Metanx because it it is difficult to swallow. Assesment and Plan    1. Idiopathic progressive neuropathy  Off of metanx because of having trouble swallowing pills  Continue to have trouble walking however seems like he has a lot on his plate we will postpone further testing until social condition is more stable    2. Vitamin B12 deficiency  Stop Mag-Ox    3. Mixed hyperlipidemia  Continue atorvastatin    4. Possible Parkinson's  Stop carbidopa  Continue ropinirole    5. Vitamin D Deficiency  Continue vitamin D    5. Memory loss  Mild neurocognitive impariment  Exelon held for weight loss  He is the primary caretaker of his wife is dealing with the sale of a property as well as caring for the property including mowing 3 acres of land and driving over an hour I feel this is overwhelming his cognitive resources which is responsible for the perceived worsening of memory. He is losing weight and is scheduled to for a colonoscopy will await these results. Allergies  Patient has no known allergies. Medications  Current Outpatient Medications   Medication Sig    doxycycline (MONODOX) 100 mg capsule     mirtazapine (REMERON) 15 mg tablet     lactulose (CHRONULAC) 10 gram/15 mL solution TAKE 15 ML BY MOUTH ONCE A DAY AS NEEDED FOR CONSTIPATION    fluorouraciL (EFUDEX) 5 % chemo cream APPLY 1 APPLICATION TO AFFECTED AREA DAILY, APPLY LEFT FOOT DAILY.     rOPINIRole (REQUIP XL) 2 mg Tb24 extended release tablet TAKE 1 TABLET BY MOUTH EVERY DAY    desmopressin (DDAVP NASAL) 10 mcg/spray (0.1 mL) solution USE 1 SPRAY AS DIRECTED AT BEDTIME (NEED TO BE SEEN)    valsartan-hydrochlorothiazide (DIOVAN-HCT) 160-12.5 mg per tablet Take 1 tablet by mouth daily.  atorvastatin (LIPITOR) 40 mg tablet Take 40 mg by mouth daily.  rivastigmine tartrate (EXELON) 3 mg capsule Take 1 Cap by mouth two (2) times daily (with meals).  rivaroxaban (XARELTO) 10 mg tablet Take  by mouth daily. No current facility-administered medications for this visit. Medical History  Past Medical History:   Diagnosis Date    Diabetes ([facility] [address]     Hypertension     Ill-defined condition     high cholesterol     Review of Systems   Constitutional: Negative for chills and fever. HENT: Negative for ear pain. Eyes: Negative for pain and discharge. Respiratory: Negative for cough and hemoptysis. Cardiovascular: Negative for chest pain and claudication. Gastrointestinal: Negative for constipation and diarrhea. Genitourinary: Negative for flank pain and hematuria. Musculoskeletal: Negative for back pain and myalgias. Skin: Negative for itching and rash. Neurological: Positive for sensory change. Negative for headaches. Endo/Heme/Allergies: Negative for environmental allergies. Does not bruise/bleed easily. Psychiatric/Behavioral: Negative for depression and hallucinations. Exam:    Visit Vitals  /72 (BP 1 Location: Left arm, BP Patient Position: Sitting, BP Cuff Size: Adult)   Resp 16      General: Well developed, well nourished. Patient in no apparent distress   Head: Normocephalic, atraumatic, anicteric sclera   Neck Normal ROM, No thyromegally   Lungs:  Clear to auscultation bilaterally, No wheezes or rubs   Cardiac: Regular rate and rhythm with no murmurs. Abd: Bowel sounds were audible. No tenderness on palpation   Ext: No pedal edema   Skin: No overt signs of rash or insect bites       NeurologicExam:  Mental Status: Alert and oriented to person place and time   Speech: Fluent no aphasia or dysarthria.    Cranial Nerves: II - XII Inact   Motor:  Right iliopsoas weakness. And symmetric weakness of small muscles of the feet. Right peroneal weakness   Reflexes:   +1/4 over the proximal tendons of the upper and lower extremities. +0/4 over distal tendons. Sensory:   Symmetric distal reduction in sensory perception affecting all modalities   Gait:  Wide based, shuffling. Tremor:   No tremor noted. Cerebellar:  Coordination intact. Neurovascular: No carotid bruits. No JVD       Imaging      MRI Results (most recent):  Results from Hospital Encounter encounter on 06/22/21    MRI BRAIN WO CONT    Narrative  INDICATION: amnesia    EXAMINATION:  MRI BRAIN WO CONTRAST    COMPARISON:  10/24/17    TECHNIQUE:  Multiplanar multisequence acquisition without contrast of the brain. FINDINGS:    Ventricles:  Midline, no hydrocephalus. Brain Parenchyma/Brainstem:  Generalized atrophy, and mild focal  encephalomalacia anterior right temporal lobe. No acute infarction. Mild chronic  microvascular ischemic disease in the supratentorial brain. Intracranial Hemorrhage:  None. Basal Cisterns:  Normal.  Flow Voids:  Normal.  Additional Comments:  N/A. Impression  Generalized volume loss and right temporal lobe encephalomalacia. Chronic  microvascular ischemic disease with no acute process.       .  Lab Review    Lab Results   Component Value Date/Time    WBC 6.4 07/01/2009 12:25 PM    HCT 43.4 07/01/2009 12:25 PM    HGB 15.0 07/01/2009 12:25 PM    PLATELET 930 04/12/2850 12:25 PM       Lab Results   Component Value Date/Time    Sodium 138 07/01/2009 12:25 PM    Potassium 3.8 07/01/2009 12:25 PM    Chloride 103 07/01/2009 12:25 PM    CO2 29 07/01/2009 12:25 PM    Glucose 99 07/01/2009 12:25 PM    BUN 15 07/01/2009 12:25 PM    Creatinine 1.0 07/01/2009 12:25 PM    Calcium 8.7 07/01/2009 12:25 PM         Lab Results   Component Value Date/Time    Vitamin B12 >2000 (H) 01/22/2019 01:17 PM    Folate 13.4 09/12/2017 01:11 PM

## 2021-10-20 RX ORDER — ROPINIROLE 2 MG/1
TABLET, FILM COATED, EXTENDED RELEASE ORAL
Qty: 90 TABLET | Refills: 3 | Status: SHIPPED | OUTPATIENT
Start: 2021-10-20 | End: 2022-08-17 | Stop reason: SDUPTHER

## 2021-11-10 ENCOUNTER — TRANSCRIBE ORDER (OUTPATIENT)
Dept: REGISTRATION | Age: 81
End: 2021-11-10

## 2021-11-10 ENCOUNTER — HOSPITAL ENCOUNTER (OUTPATIENT)
Dept: GENERAL RADIOLOGY | Age: 81
Discharge: HOME | End: 2021-11-10
Payer: MEDICARE

## 2021-11-10 DIAGNOSIS — M54.50 LOWER BACK PAIN: Primary | ICD-10-CM

## 2021-11-10 DIAGNOSIS — M54.50 LOWER BACK PAIN: ICD-10-CM

## 2021-11-10 PROCEDURE — 73502 X-RAY EXAM HIP UNI 2-3 VIEWS: CPT

## 2021-11-10 PROCEDURE — 72100 X-RAY EXAM L-S SPINE 2/3 VWS: CPT

## 2022-02-16 ENCOUNTER — ANESTHESIA EVENT (OUTPATIENT)
Dept: SURGERY | Age: 82
End: 2022-02-16
Payer: MEDICARE

## 2022-02-16 NOTE — ANESTHESIA PREPROCEDURE EVALUATION
Relevant Problems   No relevant active problems       Anesthetic History   No history of anesthetic complications            Review of Systems / Medical History  Patient summary reviewed, nursing notes reviewed and pertinent labs reviewed    Pulmonary  Within defined limits            Pertinent negatives: No sleep apnea and smoker     Neuro/Psych   Within defined limits           Cardiovascular    Hypertension: well controlled        Dysrhythmias (RBBB)   Hyperlipidemia    Exercise tolerance: >4 METS     GI/Hepatic/Renal  Within defined limits              Endo/Other    Diabetes         Other Findings            Physical Exam    Airway  Mallampati: III  TM Distance: > 6 cm  Neck ROM: normal range of motion   Mouth opening: Normal     Cardiovascular    Rhythm: regular  Rate: normal         Dental  No notable dental hx       Pulmonary  Breath sounds clear to auscultation               Abdominal  GI exam deferred       Other Findings            Anesthetic Plan    ASA: 3  Anesthesia type: MAC          Induction: Intravenous  Anesthetic plan and risks discussed with: Patient

## 2022-02-25 ENCOUNTER — HOSPITAL ENCOUNTER (OUTPATIENT)
Dept: PREADMISSION TESTING | Age: 82
Discharge: HOME | End: 2022-02-25
Payer: MEDICARE

## 2022-02-25 PROCEDURE — U0005 INFEC AGEN DETEC AMPLI PROBE: HCPCS

## 2022-02-25 NOTE — PERIOP NOTES
[address] [place] [address]  SURGICAL PRE-ADMISSION INSTRUCTIONS    ARRIVAL  · You will be called the day before your surgery with your expected arrival time. · Sign in at the  of the hospital.  You will be directed to the Surgical Waiting Room. · Please arrive at your scheduled appointment time. You have been scheduled to arrive for your procedure one or two hours prior to the expected start time of your procedure. · Every effort will be made to minimize your wait but please be aware that unforeseen circumstances may affect our schedule. EATING  · DO NOT EAT OR DRINK ANYTHING AFTER MIDNIGHT ON THE EVENING BEFORE YOUR SURGERY OR ON THE DAY OF YOUR SURGERY except for your medications (as instructed) with a sip of water. · Do not use gum, mints or lozenges on the morning of your surgery. · Please do not smoke or chew tobacco before your surgery. MEDICATIONS   · none    STOP THESE MEDICATIONS AT THE TIMES LISTED BELOW  none     DRIVING/TRANSPORATION  · Have a responsible adult to drive you home from the hospital and to stay with you over night. Please have them plan to remain in the hospital during your surgery. Your surgery will not be done if you do not have a responsible adult to take you home and to stay with you. · If you have arranged for public transport, you must have a responsible adult to ride with you (who is not the ). · You may not drive for 24 hours after anesthesia. PREPARATION  · If you have a Living WiIl/Advance Directive, please bring a copy with you to scan into your chart. · Please DO NOT wear makeup or nail polish  · Please leave valuables at home,  DO NOT wear jewelry. · Wear loose, comfortable clothing that is large enough to cover a bulky dressing.     SPECIAL INSTRUCTIONS:  none    Patient:  Sadi Santana   Date:     February 25, 2022  Time:   12:50 PM    RN:  Sally Lerner RN    Date:     February 25, 2022  Time:   12:50 PM

## 2022-02-26 LAB
SARS-COV-2, XPLCVT: NOT DETECTED
SOURCE, COVRS: NORMAL

## 2022-02-27 PROBLEM — H25.11 AGE-RELATED NUCLEAR CATARACT, RIGHT EYE: Chronic | Status: ACTIVE | Noted: 2022-02-27

## 2022-03-02 ENCOUNTER — HOSPITAL ENCOUNTER (OUTPATIENT)
Age: 82
Setting detail: OUTPATIENT SURGERY
Discharge: HOME | End: 2022-03-02
Attending: OPHTHALMOLOGY | Admitting: OPHTHALMOLOGY
Payer: MEDICARE

## 2022-03-02 ENCOUNTER — ANESTHESIA (OUTPATIENT)
Dept: SURGERY | Age: 82
End: 2022-03-02
Payer: MEDICARE

## 2022-03-02 VITALS
SYSTOLIC BLOOD PRESSURE: 121 MMHG | HEART RATE: 78 BPM | WEIGHT: 183 LBS | DIASTOLIC BLOOD PRESSURE: 67 MMHG | BODY MASS INDEX: 24.79 KG/M2 | OXYGEN SATURATION: 96 % | HEIGHT: 72 IN | RESPIRATION RATE: 16 BRPM | TEMPERATURE: 98.3 F

## 2022-03-02 PROBLEM — H25.11 AGE-RELATED NUCLEAR CATARACT, RIGHT EYE: Chronic | Status: RESOLVED | Noted: 2022-02-27 | Resolved: 2022-03-02

## 2022-03-02 PROCEDURE — 74011250636 HC RX REV CODE- 250/636: Performed by: OPHTHALMOLOGY

## 2022-03-02 PROCEDURE — 77030018831 HC SOL IRR H20 BAXT -A: Performed by: OPHTHALMOLOGY

## 2022-03-02 PROCEDURE — 76210000021 HC REC RM PH II 0.5 TO 1 HR: Performed by: OPHTHALMOLOGY

## 2022-03-02 PROCEDURE — 77030035283: Performed by: OPHTHALMOLOGY

## 2022-03-02 PROCEDURE — 74011250636 HC RX REV CODE- 250/636: Performed by: ANESTHESIOLOGY

## 2022-03-02 PROCEDURE — 77030013353: Performed by: OPHTHALMOLOGY

## 2022-03-02 PROCEDURE — 76060000031 HC ANESTHESIA FIRST 0.5 HR: Performed by: OPHTHALMOLOGY

## 2022-03-02 PROCEDURE — 77030007855 HC KNF OPHTH IKNF ALCN -A: Performed by: OPHTHALMOLOGY

## 2022-03-02 PROCEDURE — 77030019888 HC RNG PUPIL MALYGN MSRT -C: Performed by: OPHTHALMOLOGY

## 2022-03-02 PROCEDURE — 2709999900 HC NON-CHARGEABLE SUPPLY: Performed by: OPHTHALMOLOGY

## 2022-03-02 PROCEDURE — V2632 POST CHMBR INTRAOCULAR LENS: HCPCS | Performed by: OPHTHALMOLOGY

## 2022-03-02 PROCEDURE — 76010000154 HC OR TIME FIRST 0.5 HR: Performed by: OPHTHALMOLOGY

## 2022-03-02 PROCEDURE — 77030013987 HC SLV OPTH IRR ALCN -B: Performed by: OPHTHALMOLOGY

## 2022-03-02 PROCEDURE — 77030020828: Performed by: OPHTHALMOLOGY

## 2022-03-02 PROCEDURE — 77030014067 HC TIP PHACO KLMN ALCN -B: Performed by: OPHTHALMOLOGY

## 2022-03-02 PROCEDURE — 74011000250 HC RX REV CODE- 250: Performed by: OPHTHALMOLOGY

## 2022-03-02 DEVICE — LENS IOL POST 1-PC 6X13 24.5 -- ACRYSOF: Type: IMPLANTABLE DEVICE | Site: EYE | Status: FUNCTIONAL

## 2022-03-02 RX ORDER — FENTANYL CITRATE 50 UG/ML
INJECTION, SOLUTION INTRAMUSCULAR; INTRAVENOUS AS NEEDED
Status: DISCONTINUED | OUTPATIENT
Start: 2022-03-02 | End: 2022-03-02 | Stop reason: HOSPADM

## 2022-03-02 RX ORDER — LIDOCAINE HYDROCHLORIDE 20 MG/ML
2 INJECTION, SOLUTION EPIDURAL; INFILTRATION; INTRACAUDAL; PERINEURAL ONCE
Status: COMPLETED | OUTPATIENT
Start: 2022-03-02 | End: 2022-03-02

## 2022-03-02 RX ORDER — SODIUM CHLORIDE 0.9 % (FLUSH) 0.9 %
5-40 SYRINGE (ML) INJECTION EVERY 8 HOURS
Status: DISCONTINUED | OUTPATIENT
Start: 2022-03-02 | End: 2022-03-02 | Stop reason: HOSPADM

## 2022-03-02 RX ORDER — PHENYLEPHRINE HYDROCHLORIDE 100 MG/ML
1 SOLUTION/ DROPS OPHTHALMIC
Status: COMPLETED | OUTPATIENT
Start: 2022-03-02 | End: 2022-03-02

## 2022-03-02 RX ORDER — TETRACAINE HYDROCHLORIDE 5 MG/ML
1 SOLUTION OPHTHALMIC AS NEEDED
Status: COMPLETED | OUTPATIENT
Start: 2022-03-02 | End: 2022-03-02

## 2022-03-02 RX ORDER — ALBUTEROL SULFATE 0.83 MG/ML
2.5 SOLUTION RESPIRATORY (INHALATION)
Status: DISCONTINUED | OUTPATIENT
Start: 2022-03-02 | End: 2022-03-02 | Stop reason: HOSPADM

## 2022-03-02 RX ORDER — SODIUM CHLORIDE 0.9 % (FLUSH) 0.9 %
5-40 SYRINGE (ML) INJECTION AS NEEDED
Status: DISCONTINUED | OUTPATIENT
Start: 2022-03-02 | End: 2022-03-02 | Stop reason: HOSPADM

## 2022-03-02 RX ORDER — TROPICAMIDE 10 MG/ML
1 SOLUTION/ DROPS OPHTHALMIC
Status: COMPLETED | OUTPATIENT
Start: 2022-03-02 | End: 2022-03-02

## 2022-03-02 RX ORDER — KETOROLAC TROMETHAMINE 5 MG/ML
1 SOLUTION OPHTHALMIC
Status: COMPLETED | OUTPATIENT
Start: 2022-03-02 | End: 2022-03-02

## 2022-03-02 RX ORDER — ONDANSETRON 2 MG/ML
4 INJECTION INTRAMUSCULAR; INTRAVENOUS
Status: COMPLETED | OUTPATIENT
Start: 2022-03-02 | End: 2022-03-02

## 2022-03-02 RX ORDER — SODIUM CHLORIDE, SODIUM LACTATE, POTASSIUM CHLORIDE, CALCIUM CHLORIDE 600; 310; 30; 20 MG/100ML; MG/100ML; MG/100ML; MG/100ML
25 INJECTION, SOLUTION INTRAVENOUS CONTINUOUS
Status: DISCONTINUED | OUTPATIENT
Start: 2022-03-02 | End: 2022-03-02 | Stop reason: HOSPADM

## 2022-03-02 RX ORDER — ONDANSETRON 2 MG/ML
INJECTION INTRAMUSCULAR; INTRAVENOUS
Status: DISCONTINUED
Start: 2022-03-02 | End: 2022-03-02 | Stop reason: HOSPADM

## 2022-03-02 RX ORDER — TOBRAMYCIN AND DEXAMETHASONE 3; 1 MG/ML; MG/ML
1 SUSPENSION/ DROPS OPHTHALMIC AS NEEDED
Status: COMPLETED | OUTPATIENT
Start: 2022-03-02 | End: 2022-03-02

## 2022-03-02 RX ORDER — MIDAZOLAM HYDROCHLORIDE 1 MG/ML
INJECTION, SOLUTION INTRAMUSCULAR; INTRAVENOUS AS NEEDED
Status: DISCONTINUED | OUTPATIENT
Start: 2022-03-02 | End: 2022-03-02 | Stop reason: HOSPADM

## 2022-03-02 RX ADMIN — PHENYLEPHRINE HYDROCHLORIDE 1 DROP: 100 SOLUTION/ DROPS OPHTHALMIC at 07:32

## 2022-03-02 RX ADMIN — LIDOCAINE HYDROCHLORIDE 2 DROP: 35 GEL OPHTHALMIC at 07:42

## 2022-03-02 RX ADMIN — KETOROLAC TROMETHAMINE 1 DROP: 0.5 SOLUTION OPHTHALMIC at 07:43

## 2022-03-02 RX ADMIN — TETRACAINE HYDROCHLORIDE 1 DROP: 5 SOLUTION OPHTHALMIC at 07:22

## 2022-03-02 RX ADMIN — PHENYLEPHRINE HYDROCHLORIDE 1 DROP: 100 SOLUTION/ DROPS OPHTHALMIC at 07:22

## 2022-03-02 RX ADMIN — TETRACAINE HYDROCHLORIDE 1 DROP: 5 SOLUTION OPHTHALMIC at 07:42

## 2022-03-02 RX ADMIN — PHENYLEPHRINE HYDROCHLORIDE 1 DROP: 100 SOLUTION/ DROPS OPHTHALMIC at 07:43

## 2022-03-02 RX ADMIN — FENTANYL CITRATE 50 MCG: 50 INJECTION, SOLUTION INTRAMUSCULAR; INTRAVENOUS at 08:02

## 2022-03-02 RX ADMIN — MIDAZOLAM 1 MG: 1 INJECTION INTRAMUSCULAR; INTRAVENOUS at 08:00

## 2022-03-02 RX ADMIN — FENTANYL CITRATE 50 MCG: 50 INJECTION, SOLUTION INTRAMUSCULAR; INTRAVENOUS at 07:48

## 2022-03-02 RX ADMIN — KETOROLAC TROMETHAMINE 1 DROP: 0.5 SOLUTION OPHTHALMIC at 07:32

## 2022-03-02 RX ADMIN — MIDAZOLAM 1 MG: 1 INJECTION INTRAMUSCULAR; INTRAVENOUS at 07:48

## 2022-03-02 RX ADMIN — TETRACAINE HYDROCHLORIDE 1 DROP: 5 SOLUTION OPHTHALMIC at 07:32

## 2022-03-02 RX ADMIN — SODIUM CHLORIDE, POTASSIUM CHLORIDE, SODIUM LACTATE AND CALCIUM CHLORIDE 25 ML/HR: 600; 310; 30; 20 INJECTION, SOLUTION INTRAVENOUS at 07:29

## 2022-03-02 RX ADMIN — KETOROLAC TROMETHAMINE 1 DROP: 0.5 SOLUTION OPHTHALMIC at 07:22

## 2022-03-02 RX ADMIN — TROPICAMIDE 1 DROP: 10 SOLUTION/ DROPS OPHTHALMIC at 07:43

## 2022-03-02 RX ADMIN — TROPICAMIDE 1 DROP: 10 SOLUTION/ DROPS OPHTHALMIC at 07:22

## 2022-03-02 RX ADMIN — LIDOCAINE HYDROCHLORIDE 2 DROP: 35 GEL OPHTHALMIC at 07:32

## 2022-03-02 RX ADMIN — LIDOCAINE HYDROCHLORIDE 2 DROP: 35 GEL OPHTHALMIC at 07:22

## 2022-03-02 RX ADMIN — ONDANSETRON 4 MG: 2 INJECTION INTRAMUSCULAR; INTRAVENOUS at 08:19

## 2022-03-02 RX ADMIN — TROPICAMIDE 1 DROP: 10 SOLUTION/ DROPS OPHTHALMIC at 07:32

## 2022-03-02 RX ADMIN — SODIUM CHLORIDE, POTASSIUM CHLORIDE, SODIUM LACTATE AND CALCIUM CHLORIDE: 600; 310; 30; 20 INJECTION, SOLUTION INTRAVENOUS at 07:45

## 2022-03-02 NOTE — BRIEF OP NOTE
Brief Postoperative Note    Patient: Rashida Love  YOB: 1940  MRN: [id number]    Date of Procedure: 3/2/2022     Pre-Op Diagnosis: RIGHT EYE CATARACT    Post-Op Diagnosis: pseudophakia righ eye      Procedure(s):  RIGHT EYE EXTRACAPSULAR CATARACT REMOVAL WITH INSERTION OF INTRA OCULAR LENS IMPLANT (MAC/LOCAL)    Surgeon(s):  Ulysses Overly, MD    Surgical Assistant: None    Anesthesia: MAC     Estimated Blood Loss (mL): none    Complications: None    Specimens: * No specimens in log *     Implants:   Implant Name Type Inv.  Item Serial No.  Lot No. LRB No. Used Action   AcrySof IQ [id number] Intraocular Lens  [id number] [employer]  Right 1 Implanted       Drains: * No LDAs found *    Findings: cataract    Electronically Signed by Tomasa Valencia MD on 3/2/2022 at 9:06 AM

## 2022-03-02 NOTE — BRIEF OP NOTE
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  Item Serial No.  Lot No. LRB No. Used Action   AcrySof IQ [id number] Intraocular Lens  [id number] [employer]  Right 1 Implanted       Drains: * No LDAs found *    Findings: CATARACT, FLOPPY IRIS SYNDROME, POOR DILATION    Electronically Signed by Sanjiv Trammell MD on 3/2/2022 at 8:18 AM

## 2022-03-02 NOTE — DISCHARGE INSTRUCTIONS
[facility] EYE [facility]      POST OPERATIVE INSTRUCTIONS AND INFORMATION         1. THE FIRST 24 HOURS AFTER SURGERY, YOU WILL BE ASKED TO REMAIN AT BEDREST WITH YOUR HEAD ELEVATED AT 39 DEGREES THIS CAN BE DONE BY SLEEPING ON THE PILLOWS OR IN A RECLINER. YOU CAN GET UP AS NECESSARY. IF AN EYE SHIELD IS PLACED, IT  MUST REMAIN FIRMLY IN PLACE FOR THE FIRST 24 HOURS. IT WILL BE REMOVED IN THE OFFICE ON THE DAY FOLLOWING SURGERY WHEN I EXAMINE YOUR EYE. IF YOU ARE DISCHARGED WITH SUN GLASSES, THEY ARE TO BE WORN UNTIL BEDTIME WHEN AN EYE SHIELD IS TO BE TAPED OVER THE OPERATED EYE FOR SLEEP. 2.  YOU WILL NEED TO BRING ALL EYE MEDICATIONS TO YOUR APPOINTMENT THE DAY AFTER SURGERY. 3.   YOUR POST OP VISIT SCHEDULE IS AS FOLLOWS:             * ONE DAY AFTER SURGERY             * ONE WEEK AFTER SURGERY             * SIX WEEKS AFTER SURGERY (SPECTACLE REFRACTION AND DILATED EXAMINATION)    4. IT IS IMPORTANT THAT YOU WEAR EYE PROTECTION AT ALL TIMES FOR THE      FIRST WEEK AFTER [facility] CATARACT SURGERY. DURING THE DAY, YOU WILL NEED TO WEAR EITHER OUR CURRENT SPECTACLES WITH A PLAIN WINDOW GLASS LENS OR YOU MAY WISH TO PURCHASE A PAIR OF DRUG STORE BIFOCALS WITH A POWER OF +2.50 OR SO. WHEN OUTSIDE, YOU MUST WEAR THE SOLAR BOSWELL THAT ARE PROVIDED FOR YOU. AT BEDTIME, YOU MUST WEAR THE EYE SHIELD THAT IS ALSO PROVIDED. AGAIN THIS IS FOR THE FIRST WEEK FOLLOWING YOUR SURGERY.     5. IMPORTANT DOS AND DONTS:             *AVOID CONSTIPATION             *DO NOT PARTICIPATE IN SPORTS OR STRENUOUS PHYSICAL ACTIVITY INCLUDING                         SEXUAL RELATIONS             *DO NOT DRIVE FOR ONE WEEK OFTER EACH CATARACT SURGERY             *AVOID POWDERS, SPRAYS, OR OTHER THINGS THAT MIGHT GET IN YOUR EYES             *DO NOT RUB YOUR EYE OR PUT ANY PRESSURE ON YOUR EYE    6. IF YOUR HAIR IS WASHED BY SOMEONE ELSE, HAVE THEM POSITION YOU SO THAT YOU LEAN YOUR HEAD BACKWARDS INTO THE SINK TO AVOID SOAPY WATER FROM GETTING IN YOUR EYES. YOU SHOULD ALSO WEAR YOUR EYE SHIELD TO PREVENT INJURY OR CONTAMINATION TO THE EYE. 7. DO NOT HESITATE TO CALL OUR OFFICE IF YOU FEEL SOMETHING IS NOT RIGHT OR YOU HAVE ANY QUESTIONS, UNUSUAL SYMPTOMS, OR SUDDEN CHANGES IN YOUR VISION. 8. [facility]              [phone]              *[place] OFFICE         [phone]    9. [address] YOU FOR ENTRUSTING YOUR EYE CARE TO US.    10.  YOU HAVE RECEIVED SEDATION AS PART OF YOUR PROCEDURE. NO DRIVING OR OPERATING HEAVY MACHINERY FOR 24 HOURS. YOUR                       BALANCE AND JUDGEMENT MAY BE IMPAIRED. DROWSINESS MAY ALSO OCCUR. [facility] EYE [facility]      POST OPERATIVE INSTRUCTIONS AND INFORMATION         3. THE FIRST 24 HOURS AFTER SURGERY, YOU WILL BE ASKED TO REMAIN AT BEDREST WITH YOUR HEAD ELEVATED AT 39 DEGREES THIS CAN BE DONE BY SLEEPING ON THE PILLOWS OR IN A RECLINER. YOU CAN GET UP AS NECESSARY. YOUR EYE SHIELD MUST REMAIN FIRMLY IN PLACE FOR THE FIRST 24 HOURS. IT WILL BE REMOVED IN THE OFFICE ON THE DAY FOLLOWING SURGERY WHEN I EXAMINE YOUR EYE. 4.  YOU WILL NEED TO BRING ALL EYE MEDICATIONS TO YOUR APPOINTMENT THE DAY AFTER SURGERY. 3.   YOUR POST OP VISIT SCHEDULE IS AS FOLLOWS:             * ONE DAY AFTER SURGERY             * ONE WEEK AFTER SURGERY             * SIX WEEKS AFTER SURGERY (SPECTACLE REFRACTION AND DILATED EXAMINATION)    10. IT IS IMPORTANT THAT YOU WEAR EYE PROTECTION AT ALL TIMES FOR THE      FIRST WEEK AFTER EACH CATARACT SURGERY. DURING THE DAY, YOU WILL NEED TO WEAR EITHER OUR CURRENT SPECTACLES WITH A PLAIN WINDOW GLASS LENS OR YOU MAY WISH TO PURCHASE A PAIR OF DRUG STORE BIFOCALS WITH A POWER OF +2.50 OR SO. WHEN OUTSIDE, YOU MUST WEAR THE SOLAR BOSWELL THAT ARE PROVIDED FOR YOU. AT BEDTIME, YOU MUST WEAR THE EYE SHIELD THAT IS ALSO PROVIDED.   AGAIN THIS IS FOR THE FIRST WEEK FOLLOWING YOUR SURGERY. 11. IMPORTANT DOS AND DONTS:             *AVOID CONSTIPATION             *DO NOT PARTICIPATE IN SPORTS OR STRENUOUS PHYSICAL ACTIVITY INCLUDING                         SEXUAL RELATIONS             *DO NOT DRIVE FOR ONE WEEK OFTER EACH CATARACT SURGERY             *AVOID POWDERS, SPRAYS, OR OTHER THINGS THAT MIGHT GET IN YOUR EYES             *DO NOT RUB YOUR EYE OR PUT ANY PRESSURE ON YOUR EYE    12. IF YOUR HAIR IS WASHED BY SOMEONE ELSE, HAVE THEM POSITION YOU SO THAT YOU LEAN YOUR HEAD BACKWARDS INTO THE SINK TO AVOID SOAPY WATER FROM GETTING IN YOUR EYES. YOU SHOULD ALSO WEAR YOUR EYE SHIELD TO PREVENT INJURY OR CONTAMINATION TO THE EYE. 13. DO NOT HESITATE TO CALL OUR OFFICE IF YOU FEEL SOMETHING IS NOT RIGHT OR YOU HAVE ANY QUESTIONS, UNUSUAL SYMPTOMS, OR SUDDEN CHANGES IN YOUR VISION. 14. [facility] OFF[facility]              [phone]              *[place] OFFICE         [phone]    15. [address] YOU FOR ENTRUSTING YOUR EYE CARE TO US.    10.  YOU HAVE RECEIVED SEDATION AS PART OF YOUR PROCEDURE. NO DRIVING OR OPERATING HEAVY MACHINERY FOR 24 HOURS. YOUR                       BALANCE AND JUDGEMENT MAY BE IMPAIRED. DROWSINESS MAY ALSO OCCUR.

## 2022-03-02 NOTE — ANESTHESIA POSTPROCEDURE EVALUATION
Procedure(s):  RIGHT EYE EXTRACAPSULAR CATARACT REMOVAL WITH INSERTION OF INTRA OCULAR LENS IMPLANT (MAC/LOCAL). MAC    Anesthesia Post Evaluation      Multimodal analgesia: multimodal analgesia used between 6 hours prior to anesthesia start to PACU discharge  Patient location during evaluation: PACU  Patient participation: complete - patient participated  Level of consciousness: awake and alert  Pain score: 0  Airway patency: patent  Anesthetic complications: no  Cardiovascular status: acceptable  Respiratory status: acceptable  Hydration status: acceptable  Post anesthesia nausea and vomiting:  none  Final Post Anesthesia Temperature Assessment:  Normothermia (36.0-37.5 degrees C)      INITIAL Post-op Vital signs: No vitals data found for the desired time range.

## 2022-03-02 NOTE — INTERVAL H&P NOTE
The patient was counseled at length about the risks of amber Covid-19 during their perioperative period and any recovery window from their procedure. The patient was made aware that maber Covid-19  may worsen their prognosis for recovering from their procedure and lend to a higher morbidity and/or mortality risk. All material risks, benefits, and reasonable alternatives including postponing the procedure were discussed. The patient does  wish to proceed with the procedure at this time. Update History & Physical    The Patient's History and Physical of February 15,   2022 was reviewed with the patient and I examined the patient. There was no change. The surgical site was confirmed by the patient and me. Plan:  The risk, benefits, expected outcome, and alternative to the recommended procedure have been discussed with the patient. Patient understands and wants to proceed with the procedure.     Electronically signed by Chris Mclaughlin MD on 3/2/2022 at 7:37 AM

## 2022-03-04 ENCOUNTER — HOSPITAL ENCOUNTER (OUTPATIENT)
Dept: PREADMISSION TESTING | Age: 82
Discharge: HOME | End: 2022-03-04
Attending: OPHTHALMOLOGY
Payer: MEDICARE

## 2022-03-04 PROCEDURE — U0005 INFEC AGEN DETEC AMPLI PROBE: HCPCS

## 2022-03-05 LAB
SARS-COV-2, XPLCVT: NOT DETECTED
SOURCE, COVRS: NORMAL

## 2022-03-18 PROBLEM — E53.8 B12 DEFICIENCY: Status: ACTIVE | Noted: 2017-10-12

## 2022-07-12 ENCOUNTER — OFFICE VISIT (OUTPATIENT)
Dept: NEUROLOGY | Age: 82
End: 2022-07-12
Payer: MEDICARE

## 2022-07-12 VITALS
SYSTOLIC BLOOD PRESSURE: 106 MMHG | OXYGEN SATURATION: 98 % | TEMPERATURE: 97.6 F | HEART RATE: 90 BPM | WEIGHT: 177.6 LBS | DIASTOLIC BLOOD PRESSURE: 70 MMHG | RESPIRATION RATE: 16 BRPM | HEIGHT: 73 IN | BODY MASS INDEX: 23.54 KG/M2

## 2022-07-12 DIAGNOSIS — I67.89 CEREBRAL MICROVASCULAR DISEASE: ICD-10-CM

## 2022-07-12 DIAGNOSIS — G60.3 IDIOPATHIC PROGRESSIVE NEUROPATHY: ICD-10-CM

## 2022-07-12 DIAGNOSIS — G31.84 MILD NEUROCOGNITIVE DISORDER: ICD-10-CM

## 2022-07-12 DIAGNOSIS — R41.3 MEMORY LOSS: Primary | ICD-10-CM

## 2022-07-12 DIAGNOSIS — R25.9 PARKINSONIAN FEATURES: ICD-10-CM

## 2022-07-12 PROCEDURE — 1123F ACP DISCUSS/DSCN MKR DOCD: CPT | Performed by: NURSE PRACTITIONER

## 2022-07-12 PROCEDURE — 99214 OFFICE O/P EST MOD 30 MIN: CPT | Performed by: NURSE PRACTITIONER

## 2022-07-12 RX ORDER — DONEPEZIL HYDROCHLORIDE 5 MG/1
TABLET, FILM COATED ORAL
COMMUNITY
Start: 2022-05-19

## 2022-07-12 RX ORDER — ACETAMINOPHEN, DIPHENHYDRAMINE HCL, PHENYLEPHRINE HCL 325; 25; 5 MG/1; MG/1; MG/1
TABLET ORAL
COMMUNITY

## 2022-07-12 NOTE — PROGRESS NOTES
[facility]  [address] [facility] [id number] [place] [address], [facility] [address]  Tel: [phone]  Fax: [phone]      Date:  22     Name:  Bettie Ventura  :  1940  MRN:  [id number]     PCP:  Ebony Dumont MD    Chief Complaint   Patient presents with    Follow-up     Mild neurocognitive disorder former patient Dr Clearnce Oppenheim:  Patient presents today for memory concerns, regular f/u. Since his last visit, he has sold his house, living in newer house which fortunately has less land.  58 years, still going through things. It's a lot to do, keeping him busy. Younger son lives fairly close and grandson that is 3year old. Other son is in [place]. 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 Used to be in the [employer]. Driving eval every 6 months. Uses handheld controls. Swallowing is ok, patient denies any recent issues. C/o Memory loss. Pt will ask questions but then has to repeat himself. Will forget where he puts things. Has to write things down. Patient's primary care is Dr. Ron Vides, upon review of his chart looks like Dr. Cynthia Xiong recently started him on Aricept. Dr. Vinny Oliver used to have him on Exelon that was held due to weight loss. Problems with sleeping at night, no nightmares. No hallucinations. Recap from last visit 2021 with Dr Vinny Oliver:   1. Idiopathic progressive neuropathy  Off of metanx because of having trouble swallowing pills  Continue to have trouble walking however seems like he has a lot on his plate we will postpone further testing until social condition is more stable     2. Vitamin B12 deficiency  Stop Mag-Ox     3. Mixed hyperlipidemia  Continue atorvastatin     4. Possible Parkinson's  Stop carbidopa  Continue ropinirole     5.  Vitamin D Deficiency  Continue vitamin D     5. Memory loss  Mild neurocognitive impariment  Exelon held for weight loss  He is the primary caretaker of his wife is dealing with the sale of a property as well as caring for the property including mowing 3 acres of land and driving over an hour I feel this is overwhelming his cognitive resources which is responsible for the perceived worsening of memory. He is losing weight and is scheduled to for a colonoscopy will await these results. REVIEW OF SYSTEMS:     Review of Systems   Eyes: Negative. Gastrointestinal: Positive for constipation. Negative for nausea and vomiting. Good appetite   Genitourinary:        Frequent urination   Musculoskeletal: Negative for falls. Neurological: Positive for sensory change (B feet at times). Negative for dizziness, tingling, tremors, seizures and headaches. Psychiatric/Behavioral: Positive for depression (at times) and memory loss. Negative for hallucinations. The patient has insomnia. All other systems reviewed and are negative. Current Outpatient Medications   Medication Sig    melatonin 10 mg tab Take  by mouth.  rOPINIRole (REQUIP XL) 2 mg Tb24 extended release tablet TAKE 1 TABLET BY MOUTH EVERY DAY    mirtazapine (REMERON) 15 mg tablet Take 15 mg by mouth nightly.  valsartan-hydrochlorothiazide (DIOVAN-HCT) 160-12.5 mg per tablet Take 1 tablet by mouth daily.  atorvastatin (LIPITOR) 40 mg tablet Take 40 mg by mouth daily.  donepeziL (ARICEPT) 5 mg tablet TAKE 1 TABLET BY MOUTH EVERY DAY AT BEDTIME FOR 30 DAYS     No current facility-administered medications for this visit.      No Known Allergies  Past Medical History:   Diagnosis Date    Diabetes ([facility] [address]     Hypertension     Ill-defined condition     high cholesterol     Past Surgical History:   Procedure Laterality Date    HX CATARACT REMOVAL  2022    right eye    HX UROLOGICAL      surgeries for frequent urination     Social History     Socioeconomic History    Marital status:      Spouse name: Not on file    Number of children: Not on file    Years of education: Not on file    Highest education level: Not on file   Occupational History    Not on file   Tobacco Use    Smoking status: Never Smoker    Smokeless tobacco: Never Used   Vaping Use    Vaping Use: Never used   Substance and Sexual Activity    Alcohol use: Yes     Comment: 1 etoh drink/day    Drug use: No    Sexual activity: Not on file   Other Topics Concern    Not on file   Social History Narrative    Not on file     Social Determinants of Health     Financial Resource Strain:     Difficulty of Paying Living Expenses: Not on file   Food Insecurity:     Worried About Running Out of Food in the Last Year: Not on file    Patricia of Food in the Last Year: Not on file   Transportation Needs:     Lack of Transportation (Medical): Not on file    Lack of Transportation (Non-Medical):  Not on file   Physical Activity:     Days of Exercise per Week: Not on file    Minutes of Exercise per Session: Not on file   Stress:     Feeling of Stress : Not on file   Social Connections:     Frequency of Communication with Friends and Family: Not on file    Frequency of Social Gatherings with Friends and Family: Not on file    Attends Spiritism Services: Not on file    Active Member of [address] [place] [address] or Organizations: Not on file    Attends Club or Organization Meetings: Not on file    Marital Status: Not on file   Intimate Partner Violence:     Fear of Current or Ex-Partner: Not on file    Emotionally Abused: Not on file    Physically Abused: Not on file    Sexually Abused: Not on file   Housing Stability:     Unable to Pay for Housing in the Last Year: Not on file    Number of [place] in the Last Year: Not on file    Unstable Housing in the Last Year: Not on file     Family History   Problem Relation Age of Onset    Mental Retardation Daughter      EXAMINATION:  MRI BRAIN WO CONTRAST     COMPARISON:  10/24/17     TECHNIQUE:  Multiplanar multisequence acquisition without contrast of the brain.        FINDINGS:    Ventricles:  Midline, no hydrocephalus. Brain Parenchyma/Brainstem:  Generalized atrophy, and mild focal  encephalomalacia anterior right temporal lobe. No acute infarction. Mild chronic  microvascular ischemic disease in the supratentorial brain. Intracranial Hemorrhage:  None. Basal Cisterns:  Normal.   Flow Voids:  Normal.  Additional Comments:  N/A.     IMPRESSION  Generalized volume loss and right temporal lobe encephalomalacia. Chronic  microvascular ischemic disease with no acute process. PHYSICAL EXAMINATION:    Visit Vitals  /70 (BP 1 Location: Left upper arm, BP Patient Position: Sitting, BP Cuff Size: Adult)   Pulse 90   Temp 97.6 °F (36.4 °C) (Temporal)   Resp 16   Ht 6' 1\" (1.854 m)   Wt 177 lb 9.6 oz (80.6 kg)   SpO2 98%   BMI 23.43 kg/m²       General:  Well defined, nourished, and well groomed individual in no acute distress. Musculoskeletal: Bilateral lower extremities revealed trace edema and had full range of motion of joints, some stiffness noted. Psych:  Good mood and bright affect    NEUROLOGICAL EXAMINATION:     Mental Status:   Alert and oriented to person, place, and time with recent and remote memory intact. Attention span and concentration are normal. Clear speech. Fund of knowledge preserved. 3 word recall: only 1 correct. Serial 7's did well. World backwards: correct. Cranial Nerves:   PERRLA. Visual fields were full  EOM: no evidence of nystagmus  Facial sensation:  normal and symmetric  Facial motor: normal and symmetric, no facial droop noted. Hearing intact  SCM strength intact  Tongue: midline without fasciculations    Motor Examination: Normal tone. 5/5 muscle strength in bilateral upper and lower extremities. Mild cogwheel rigidity. No muscle wasting, no twitching or fasciculation noted. Sensory exam:  Normal throughout to light touch in BUE and BLE.      Coordination:   Finger to nose and rapid arm movement testing was normal.  No resting or intention tremor. Negative Romberg, negative pronator drift. Gait and Station: Slow but steady. Shuffling steps noted, slightly decreased arm swing. Some en bloc turning noted. Reflexes:  DTRs 2+ in bilateral biceps, brachioradialis, patella and ankle. No clonus noted. ASSESSMENT AND PLAN      ICD-10-CM ICD-9-CM    1. Memory loss  R41.3 780.93 REFERRAL TO NEUROPSYCHOLOGY   2. Idiopathic progressive neuropathy  G60.3 356.4    3. Mild neurocognitive disorder  G31.84 331.83 REFERRAL TO NEUROPSYCHOLOGY   4. Parkinsonian features  R25.9 781.0 REFERRAL TO NEUROPSYCHOLOGY   5. Cerebral microvascular disease  I67.89 437.8      1. Memory loss: Patient's last neuropsych testing completed in 2019, will order another test, modify his plan based on results. We will also request records from his primary care Dr. Tiffanie [address] as it appears that he is recently started the patient on Aricept. Therefore at this time I will defer any new medications otherwise encourage patient to use a pillbox, write things down etc. Healthy lifestyle encouraged, including good sleep-wake cycle. -     REFERRAL TO NEUROPSYCHOLOGY  2. Idiopathic progressive neuropathy: Appears to be noncontributory at this time, patient is to continue monitor signs and symptoms. Fall precautions recommended. 3. Mild neurocognitive disorder: We will reorder neuropsych testing as this was last done in 2019, based upon results can modify plan of care. -     REFERRAL TO NEUROPSYCHOLOGY  4. Parkinsonian features: Patient is taking the Requip, at one-point it appears the patient was on Sinemet but it was held, will defer adding any new medications at this time. Patient is to continue with regular activity and movement is much as tolerated. Monitor for any worsening signs or symptoms.  -     REFERRAL TO NEUROPSYCHOLOGY  5. Cerebral microvascular disease: Healthy lifestyle encouraged, update imaging as needed for worsening signs and symptoms.   Follow-up with primary care for ongoing management of cholesterol etc.      Patient  verbalized understand of all instructions and all questions/concerns were addressed. Safety/side effects of medications discussed. Patient remains a complex patient secondary to polypharmacy, significant comorbid conditions, and use of multiple medications which complicate the decision making process related to patient's neurologic diagnosis. I will plan on seeing the patient back in 6 months, sooner if needed. All instructions and recommendations were written out in the patient's wrap-up.       Severo Armendariz, NITHIN-BC

## 2022-07-12 NOTE — PATIENT INSTRUCTIONS
Will refer you to Dr Kevin Nieves for another Neuropsych test to assess your memory. Continue the Aricept (rx'ed by your PCP): for your memory. We will request records from Your PCP. Sleep Hygiene encouraged. Continue with activities during the day, continue to use the pill box to make sure you are taking your meds properly. We will plan to see you back in 6 months, sooner if needed.

## 2022-07-12 NOTE — PROGRESS NOTES
Chief Complaint   Patient presents with    Follow-up     Mild neurocognitive disorder former patient Dr Loc Murray     Requesting refill of requip not resting well has left foot issues.

## 2022-08-17 RX ORDER — ROPINIROLE 2 MG/1
2 TABLET, FILM COATED, EXTENDED RELEASE ORAL DAILY
Qty: 90 TABLET | Refills: 3 | Status: SHIPPED | OUTPATIENT
Start: 2022-08-17

## 2022-08-17 NOTE — TELEPHONE ENCOUNTER
Requested Prescriptions     Pending Prescriptions Disp Refills    rOPINIRole (REQUIP XL) 2 mg Tb24 extended release tablet 90 Tablet 3     Sig: Take 1 Tablet by mouth daily. Last office visit: 7/12/2022    Last refill: 10/20/2021 by Susana Mai    Next appointment: 1/12/2023    Please review and fill if warranted.

## 2022-10-27 ENCOUNTER — OFFICE VISIT (OUTPATIENT)
Dept: NEUROLOGY | Age: 82
End: 2022-10-27
Payer: MEDICARE

## 2022-10-27 DIAGNOSIS — F02.A4 MILD LATE ONSET ALZHEIMER'S DEMENTIA WITH ANXIETY (HCC): Primary | ICD-10-CM

## 2022-10-27 DIAGNOSIS — G30.1 MILD LATE ONSET ALZHEIMER'S DEMENTIA WITH ANXIETY (HCC): Primary | ICD-10-CM

## 2022-10-27 DIAGNOSIS — G31.84 MILD COGNITIVE IMPAIRMENT: Primary | ICD-10-CM

## 2022-10-27 DIAGNOSIS — F43.22 ADJUSTMENT DISORDER WITH ANXIOUS MOOD: ICD-10-CM

## 2022-10-27 PROCEDURE — 1123F ACP DISCUSS/DSCN MKR DOCD: CPT | Performed by: CLINICAL NEUROPSYCHOLOGIST

## 2022-10-27 PROCEDURE — 90791 PSYCH DIAGNOSTIC EVALUATION: CPT | Performed by: CLINICAL NEUROPSYCHOLOGIST

## 2022-10-27 PROCEDURE — 96138 PSYCL/NRPSYC TECH 1ST: CPT | Performed by: CLINICAL NEUROPSYCHOLOGIST

## 2022-10-27 PROCEDURE — 96139 PSYCL/NRPSYC TST TECH EA: CPT | Performed by: CLINICAL NEUROPSYCHOLOGIST

## 2022-10-27 PROCEDURE — 96136 PSYCL/NRPSYC TST PHY/QHP 1ST: CPT | Performed by: CLINICAL NEUROPSYCHOLOGIST

## 2022-10-27 NOTE — PROGRESS NOTES
Intake Note      Patient Name: Alma Benites  YOB: 1940    Age: 80 y.o. Date of Intake: 10/27/2022   Education: 16 Ethnicity White   Gender: Male Referring Provider: Mike Mayers NP     REASON FOR REFERRAL AND EVALUATION PROCEDURES:  Alma Benites  was referred for evaluation by his Neurology Nurse Practitioner to assist in differential diagnosis and individualized treatment planning. he understood the rationale and procedures for evaluation, as well as the limits to confidentiality, and agreed to participate. he consented to have this report made available to his  treating providers through his  electronic medical records. History Sources: Patient and Medical Records    HISTORY OF PRESENT ILLNESS:  The patient is a 53-year-old male whose medical records with significant diagnoses including idiopathic progressive neuropathy, vitamin B12 deficiency, mixed hyperlipidemia, possible Parkinson's, vitamin D deficiency, and memory loss. He presented independently for clinical interview and was capable of providing adequate history. Per the patient's report, over the past couple years, he experienced the insidious onset of gradually progressive cognitive difficulties. He described his primary cognitive concerns to include short-term episodic memory loss. The patient provided specific examples such as \"I can ask my wife a question and a few minutes later I have to ask her the same question\" and \"if I am watching a basketball game on TV I might not remember it later on. \"  In addition to his memory concerns, the patient also reported the presence of attention deficits such as frequently misplacing personal belongings, becoming easily distracted, and losing his train of thought. The patient participated in a neuropsychological evaluation with Dr. Jannie Palmer in June 2019. Results from that evaluation revealed:   \"His performance on measures of attention, processing speed and executive functioning were in the average range. Deficits were noted on measures of working memory and attention to visual detail task. His performance on measures assessing spatial and language function was variable. 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 Although he was within normal limits on a naming task, his verbal fluency was moderately below expectations. Clearly his greatest difficulties are concerning his memory. This includes this delayed recall of both visual and verbal information. He demonstrated an adequate level of cognitive flexibility. \"    Since the patient's previous evaluation, he reported his cognitive difficulties have continued to gradually worsen. He reported experiencing the evolution of occasional difficulties completing complex instrumental activities of daily living. Specifically, the patient reported he has been noticing a pattern of forgetting to take his nighttime medication. He also reported he is experiencing greater challenges using phones and electronics around the home. The patient stated he remains independent with financial management and meal preparation. He also reported he is capable of driving, though he is on a restricted license due to visual impairment and cannot drive at night. The patient denied problems with basic activities of daily living. Pertaining to the patient's psychiatric history, he denied knowledge of previous clinically significant symptomatology, diagnosis, or treatment. He described his recent mood to be \"kind of frustrated\" and attributed his frustration to recent stressors including caring for his wife who has Parkinson's disease.     PERTINENT MEDICAL HISTORY:  Previous neurology records list the following pertinent history:  Idiopathic progressive neuropathy  Vitamin B12 deficiency  Mixed hyperlipidemia  Possible Parkinson's  Vitamin D deficiency memory loss    Pertaining to the patient's other medical and physical functioning, the patient described his sleep to be \"lousy. \"  He reported that most nights he has difficulty initiating and sustaining sleep and he typically feels tired throughout the day. He was uncertain if he snored but denied previously participated in a sleep study. The patient also reported history of shuffling gait and stated he currently walks with a cane. However, he denied falls in the last year, though he said he does have to catch himself occasionally. The patient also reported he has been more clumsy and prone to dropping things lately, though he denied the presence of tremor. He also reported urinary and fecal incontinence but denied other symptoms potentially suggestive of autonomic dysfunction. The patient reported visual impairments for which she wears corrective lenses but denied loss of smell or taste. Family neurological history: Reportedly unremarkable    Current Outpatient Medications:     rOPINIRole (REQUIP XL) 2 mg Tb24 extended release tablet, Take 1 Tablet by mouth daily. , Disp: 90 Tablet, Rfl: 3    donepeziL (ARICEPT) 5 mg tablet, TAKE 1 TABLET BY MOUTH EVERY DAY AT BEDTIME FOR 30 DAYS, Disp: , Rfl:     melatonin 10 mg tab, Take  by mouth., Disp: , Rfl:     mirtazapine (REMERON) 15 mg tablet, Take 15 mg by mouth nightly., Disp: , Rfl:     valsartan-hydrochlorothiazide (DIOVAN-HCT) 160-12.5 mg per tablet, Take 1 tablet by mouth daily. , Disp: , Rfl:     atorvastatin (LIPITOR) 40 mg tablet, Take 40 mg by mouth daily. , Disp: , Rfl:     Pertinent Neurological History:  Seizure: Never  Stroke: Never  TBI: Never    Neurodiagnostic Findings:  Imaging: MRI brain (6/22/2021) revealed \"generalized volume loss and right temporal lobe encephalomalacia. Chronic microvascular ischemic disease with no acute process. \"    PSYCHOSOCIAL HISTORY:  Birth/Development: The patient was born and raised in [place].   Language: English  Education: Bachelor's degree in [employer] science  Academic Problems: Denied. 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  Service: 2 years in the [employer]  Relationship Status:  62 years  Children: 3 children  Housing: Private residence with wife  Legal: Denied    Substance Use:  Alcohol: 1 glass of wine per day  Nicotine: Denied  Recreational/Illicit Substances: Denied  Treatment: Denied    BEHAVIORAL OBSERVATIONS:  Appearance: Casually dressed, Well-groomed, and Appeared stated age  Orientation: Person, Place, Time, and Situation  Motor: Ambulated independently with slow and shuffling gait. No observed tremor  Thought Processes: Tangential  Hearing and Vision: Adequate  Speech: shows no evidence of impairment  Comprehension: Adequate  Interpersonal Skills: Adequate  Affect: Appropriate  Ability as Historian: Adequate  Insight: Adequate  Judgment: Adequate    STRENGTHS:  Exercising self-direction/Resourceful, Access to housing/residential stability, and Interpersonal/supportive relationships (family, friends, peers)    WEAKNESSES:  Health problems and Cognitive limitations    IMPRESSION:  The patient is a 29-year-old male who presents with concerns regarding his cognitive functioning. Previous neuropsychological evaluation (2019) revealed evidence of weaknesses; however, the objective presence and severity of the patient's current cognitive difficulties is unknown and the degree to which it may be related to normal aging versus psychiatric factors versus an organic etiology requires further clarification. Comprehensive evaluation will assist with obtaining a quantitative assessment of the patient's cognitive and emotional functioning to guide differential diagnosis and treatment planning.     ASSESSMENT:  Mild cognitive impairment: G31.84  Adjustment disorder with anxious mood: F43.22    PLAN:  Patient will participate in comprehensive evaluation in order to obtain a quantitative assessment of their cognitive and emotional functioning  Differential diagnosis and treatment planning will be based upon results from clinical interview and objective testing  Patient will be provided with review of results, impressions, and recommendations during feedback session  Patient will be encouraged to follow-up with referring provider for ongoing management        TIME DOCUMENTATION:  Clinical Interview: 7533 - 5498 = 30 minutes    [id number]

## 2022-11-08 NOTE — PROGRESS NOTES
Neuropsychological Evaluation Report      Patient Name: Rosie Buchanan  YOB: 1940    Age: [de-identified] y.o. Date of Intake: 10/27/2022   Education: 16 Date of Testing: 10/27/2022   Gender: Male Ethnicity: White     Referring Provider: Lucía Crooks NP     REASON FOR REFERRAL AND EVALUATION PROCEDURES:  Rosie Buchanan  was referred for neuropsychological evaluation by his Neurology Nurse Practitioner to obtain a quantitative assessment of his current level of neurocognitive functioning, assist in differential diagnosis, and aid in individualized treatment planning. The patient understood the rationale and procedures for evaluation, as well as the limits to confidentiality, and they agreed to participate. The patient consented to have this report made available to his  treating providers through his  electronic medical records. This evaluation was completed with the patient by Shanda Rodriguez PsyD with the exception of testing by technician, which was completed by WILLY Nguyen under the supervision of Dr. Rayne Nicole. History Sources: Patient, Medical Records, Rating Scales, and Assessment Instruments    SUMMARY AND IMPRESSION:  The following section is a summary of the patient's pertinent history, test results, and impressions. A more thorough review of the patient's background and test scores can be found below. The patient is a 37-year-old male with medical history significant for idiopathic progressive neuropathy, vitamin B12 deficiency, mixed hyperlipidemia, possible Parkinson's, vitamin D deficiency, and memory loss. He reported experiencing the insidious onset of gradually progressive short-term episodic memory impairment and attention deficits over the past several years. A previous neuropsychological evaluation (6/2019) revealed the patient's greatest weakness was in memory.   Since the patient's previous evaluation, his cognitive difficulties have reportedly continued to progress and he now experiences intermittent difficulties completing complex instrumental activities of daily living. Relative to the patient's baseline level of functioning (high average; approximately 75th percentile), the patient's neuropsychological evaluation was most significant for variable but generally moderate declines (~2-2.5 standard deviations below baseline) on measures of learning, profound declines (3.5 standard deviations below baseline) on measures of free recall, and limited benefit from recognition cueing (moderate declines: 2.5 standard deviations below baseline). Profound declines were also demonstrated on measures of expressive language with relatively more impairment on semantically related tasks. Moderate declines were also revealed on measures of executive functioning. Mental processing speed and visuospatial perception/construction were generally inefficient (~1 standard deviation below baseline) while brief attention/working memory were near normal limits. Simulated measures of daily functioning were consistently impaired. Brief assessment of psychopathology revealed mild symptoms of anxiety    Regarding changes since the previous evaluation, while differences in the testing battery and the duration of time that has passed since the previous evaluation preclude formal statistical comparison of scores (reliable change), qualitative comparison can be somewhat helpful. To that end, significant declines were demonstrated on measures of executive functioning, learning, and memory. While graphic representations of the comparison appear to suggest improvements on measures of attention, visuospatial functioning, and recognition/discrimination, these changes are due to an expanded test battery rather than genuine improvement.     Diagnostically, the degree of impairment revealed by testing, the patient's report of sporadic functional difficulties, and the deficits demonstrated on simulated functional testing support the diagnosis of dementia. While the degree of decline demonstrated on testing is rather significant and would typically be more representative of moderate dementia, the patient's high average baseline level of ability appears to be affording him a greater level of cognitive reserve. This is allowing him to somewhat compensate for his cognitive difficulties and is more consistent with a mild degree of dementia at this time. Regarding the etiology of the patient's cognitive impairment, while his medical record is noted for possible parkinsonism, testing does not appear to be consistent with an alpha-synuclein pathology. 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 During this appointment, the results of the evaluation will be reviewed, recommendations will be offered (see below), and the patient will be provided with the opportunity to ask questions. The patient will be encouraged to review the results of this evaluation with his providers and discuss potential implications for treatment. Specifically:  Rule out of potentially reversible contributory factors may assist in refining differential diagnosis. For this reason, if warranted and not already considered, updating the following labs may be beneficial: B12, Folate, TSH, & T4 Free. Updated MRI of the brain or DaTscan may assist with diagnosis  At this time, the patient's neuropsychological evaluation suggests he does maintain capacity to make decisions.  If not already addressed, the patient and his family are encouraged to discuss legal issues such as power of  to assist the patient in future financial and healthcare decisions. Information regarding these issues can be found at www.caringinfo. org or www.agingwithdignity.org/5wishes.html  As the patient is currently in the Mild stage of dementia, daily supervision and assistance is recommended. In general, the patient will benefit from a structured, routine environment that will provide assistance with complex activities requiring memory, organization, and planning (e.g., meal preparation, medication management) while allowing them to maintain independence in other basic activities of daily living (e.g., cleaning, dressing, feeding) for as long as possible. Given the patient's current level of functioning, it is anticipated they presently need less than 24-hour care; however, as the disease progresses, their needs for supervision should be reassessed. Continued monitoring and treatment of the patient's neuropsychiatric symptoms/dementia is recommended. Due to their level of cognitive impairment, they will benefit from a combination of psychopharmacology, environmental management, and concrete behaviorally-focused interventions. Examples of environmental strategies include:  Keep a steady routine environment and using visual cues, such as whiteboards, to help provide reminders of tasks. Take additional time to complete tasks and limit the amount of external distracters in the environment when having to focus on a task;  Break larger tasks into smaller ones and take frequent, regularly scheduled breaks;   Write down information as soon as possible and to utilize a day planner/calendar, especially when recording doctor's appointments and medicine regimens;  Development of a memory book may be beneficial. Memory books typically contain a day planner/calendar component; however, they also contain organized sections for recording frequently asked questions and information about routines that the patient will be able to access and use independently. Pair behaviors or items to be remembered with well learned patterns or routines. For example, place medications by your toothbrush so that brushing your teeth will cue you to take your medication; and,  Designate a memory place in which you keep all of your important personal belongings (e.g. wallet, keys) when not in use. The patient will be provided with psychoeducation regarding empirically determined modifiable risk and protective factors for cognitive decline. Specifically:  The patient will be encouraged to engage in approximately 2.5 hours of physician approved physical activity on a weekly basis. The patient will be encouraged to maintain a healthy diet. they will also be informed of the Mediterranean diet, which has been associated with reduced risk for neurodegenerative conditions as well as heart disease. The patient will be encouraged to maintain a cognitively stimulated lifestyle, also incorporating social interaction. The patient's family and caretakers may benefit from reaching out to local support groups in the community. These resources may be able to provide medical assistance or support and reduce the risk of caregiver burnout. The Alzheimer's Association (Phone: 671.143.9727; Website: Shefali Eubanks) provides general information about cognitive decline in late life, along with local resources. The book The 36 Hour Day: A Family Guide to Caring for Persons with Alzheimer's Disease, Related Dementing Illnesses, and Memory Loss in Later Life, by Mariam Callaway and Carter Conrad. I would like to see the patient for follow-up evaluation in approximately 12 months in order to monitor his progress and update treatment planning.        HISTORY OF PRESENT ILLNESS:  The patient is a 81-year-old male whose medical records with significant diagnoses including idiopathic progressive neuropathy, vitamin B12 deficiency, mixed hyperlipidemia, possible Parkinson's, vitamin D deficiency, and memory loss. He presented independently for clinical interview and was capable of providing adequate history. Per the patient's report, over the past couple years, he experienced the insidious onset of gradually progressive cognitive difficulties. He described his primary cognitive concerns to include short-term episodic memory loss. The patient provided specific examples such as \"I can ask my wife a question and a few minutes later I have to ask her the same question\" and \"if I am watching a basketball game on TV I might not remember it later on. \"  In addition to his memory concerns, the patient also reported the presence of attention deficits such as frequently misplacing personal belongings, becoming easily distracted, and losing his train of thought. The patient participated in a neuropsychological evaluation with Dr. Chele Ruelas in June 2019. Results from that evaluation revealed: \"His performance on measures of attention, processing speed and executive functioning were in the average range. Deficits were noted on measures of working memory and attention to visual detail task. His performance on measures assessing spatial and language function was variable. 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 Although he was within normal limits on a naming task, his verbal fluency was moderately below expectations. Clearly his greatest difficulties are concerning his memory. This includes this delayed recall of both visual and verbal information. He demonstrated an adequate level of cognitive flexibility. \"     Since the patient's previous evaluation, he reported his cognitive difficulties have continued to gradually worsen. He reported experiencing the evolution of occasional difficulties completing complex instrumental activities of daily living. Specifically, the patient reported he has been noticing a pattern of forgetting to take his nighttime medication. He also reported he is experiencing greater challenges using phones and electronics around the home. The patient stated he remains independent with financial management and meal preparation. He also reported he is capable of driving, though he is on a restricted license due to visual impairment and cannot drive at night. The patient denied problems with basic activities of daily living. Pertaining to the patient's psychiatric history, he denied knowledge of previous clinically significant symptomatology, diagnosis, or treatment. He described his recent mood to be \"kind of frustrated\" and attributed his frustration to recent stressors including caring for his wife who has Parkinson's disease. PERTINENT MEDICAL HISTORY:  Previous neurology records list the following pertinent history:  Idiopathic progressive neuropathy  Vitamin B12 deficiency  Mixed hyperlipidemia  Possible Parkinson's  Vitamin D deficiency memory loss     Pertaining to the patient's other medical and physical functioning, the patient described his sleep to be \"lousy. \"  He reported that most nights he has difficulty initiating and sustaining sleep and he typically feels tired throughout the day. He was uncertain if he snored but denied previously participated in a sleep study. The patient also reported history of shuffling gait and stated he currently walks with a cane. However, he denied falls in the last year, though he said he does have to catch himself occasionally. The patient also reported he has been more clumsy and prone to dropping things lately, though he denied the presence of tremor. He also reported urinary and fecal incontinence but denied other symptoms potentially suggestive of autonomic dysfunction. The patient reported visual impairments for which she wears corrective lenses but denied loss of smell or taste.      Family neurological history: Reportedly unremarkable     Current Outpatient Medications:     rOPINIRole (REQUIP XL) 2 mg Tb24 extended release tablet, Take 1 Tablet by mouth daily. , Disp: 90 Tablet, Rfl: 3    donepeziL (ARICEPT) 5 mg tablet, TAKE 1 TABLET BY MOUTH EVERY DAY AT BEDTIME FOR 30 DAYS, Disp: , Rfl:     melatonin 10 mg tab, Take  by mouth., Disp: , Rfl:     mirtazapine (REMERON) 15 mg tablet, Take 15 mg by mouth nightly., Disp: , Rfl:     valsartan-hydrochlorothiazide (DIOVAN-HCT) 160-12.5 mg per tablet, Take 1 tablet by mouth daily. , Disp: , Rfl:     atorvastatin (LIPITOR) 40 mg tablet, Take 40 mg by mouth daily. , Disp: , Rfl:      Pertinent Neurological History:  Seizure: Never  Stroke: Never  TBI: Never     Neurodiagnostic Findings:  Imaging: MRI brain (6/22/2021) revealed \"generalized volume loss and right temporal lobe encephalomalacia. Chronic microvascular ischemic disease with no acute process. \"     PSYCHOSOCIAL HISTORY:  Birth/Development: The patient was born and raised in [facility]. Language: English  Education: Bachelor's degree in [email or web address] science  Academic Problems: Denied. 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  Service: 2 years in the "[employer]"  Relationship Status:  62 years  Children: 3 children  Housing: Private residence with wife  Legal: Denied     Substance Use:  Alcohol: 1 glass of wine per day  Nicotine: Denied  Recreational/Illicit Substances: Denied  Treatment: Denied     BEHAVIORAL OBSERVATIONS:  Appearance: Casually dressed, Well-groomed, and Appeared stated age  Orientation: Person, Place, Time, and Situation  Motor: Ambulated independently with slow and shuffling gait.   No observed tremor  Thought Processes: Tangential  Hearing and Vision: Adequate  Speech: shows no evidence of impairment  Comprehension: Adequate  Interpersonal Skills: Adequate  Affect: Appropriate  Ability as Historian: Adequate  Insight: Adequate  Judgment: Adequate  Testing Behaviors: Rapport was adequately established between the patient and the examiner. The patient remained alert and focused throughout testing. They maintained a cooperative attitude and appeared to approach measures and a goal oriented fashion. TESTING  Measures administered by provider:  Test of Premorbid Functioning (TOPF) and Clock drawing test    Measures administered by technician:  [address] Rivera; Wechsler Adult Intelligence Scale - Fourth Edition (WAIS-IV: Select Components); Trail Making Test A&B; Mindy-Tan Executive Function System (D-KEFS: Select Components); Modified [employer] (M-WCST); Judgment of Line Orientation (Betina Milner); Mason Complex Figure Test - Copy (RCFT: Copy); Verbal Fluency (FAS & Animals); Neuropsychological Assessment Battery (NAB: Select Language Components); Sheppard Verbal Learning Test - Revised (HVLT-R); Brief Visuospatial Memory Test - Revised (BVMT-R); Wechsler Memory Scale - Fourth Edition (WMS-IV: Select Components); Pillbox Test; Test of Practical Judgment (TOP-J: Form B); Teaberry Sleepiness Scale (ESS); Atwood Sleep Quality Inventory (PSQI); Geriatric Depression Scale - Short Form (GDS-SF); and Generalized Anxiety Disorder - 7 (TE-7). Results:   For standardized tests, performance was compared to a demographically matched sample of neurocognitively healthy adults using the following classification system to indicate deviation from mean (or average) test performance:    Normally Distributed Not-Normally Distributed   Descriptor Percentile Descriptor Percentile   \"Exceptionally High\" >97th \"Within Normal Limits\" >24th   \"Above Average\" 91st - 97th \"Low Average\" 9th - 24th   \"High Average\" 75th - 90th \"Below Average\" 2nd - 8th   \"Average\" 25th - 74th \"Exceptionally Low\" <2nd   \"Low Average\" 9th - 24th    \"Below Average\" 2nd - 8th    \"Exceptionally Low\" <2nd Performance and symptom validity are analyzed in a number of ways, including administration of neuropsychological measures that have been empirically shown to identify suboptimal performance or purposeful exaggeration. These tests and their scores have been redacted from this report in order to ensure test security, but are available to formally trained neuropsychologists upon request. In addition, when possible, the overall pattern of performance is analyzed for consistency between measures, consistency with the expected severity of impairment, and the presenting symptoms are compared against base rates of symptoms in other patients with similar problems. The patient's performances were within acceptable limits, indicating the results of the present evaluation are believed to be valid and reliable. Premorbid Functioning:   High average  Attention:   Brief auditory attention: Average  Auditory working memory: Average to above average  Processing Speed:  Average  Executive Functioning:   Mental set switching: Low average; 1 sequencing error  Problem Solving: Exceptionally low  Inhibition: Below average for speed but high average for accuracy  Inhibition/Switching: Average for speed and accuracy  Visuospatial:  Basic visuoperception: Average  Clock drawing: Preserved number sequence but inconsistent spacing. Incorrect hand placement. Preserved contour  Pattern reconstruction: Average  Complex figure copy:  Within normal limits  Language:  Confrontation naming: Exceptionally low with limited benefit from semantic and phonemic cueing  Letter fluency: Below average  Semantic Fluency: Exceptionally low  Learning   List learning: Exceptionally low  Story learning: Below average  Basic figure learning: Exceptionally low  Progressively complex figure learning: Average  Memory:  List recall: Exceptionally low  Story recall: Exceptionally low  Basic figure recall: Exceptionally low  Progressively complex figure recall: Exceptionally low  Recognition:  List recognition: Exceptionally low  Story recognition: Exceptionally low  Basic figure recognition: Below average  Progressively complex figure recognition: Low average  Functional:   Pillbox organization: Failed  Bill pay: Exceptionally low  Practical judgment: Below average  Psychiatric/Sleep:   Depression: Within normal limits  Anxiety: Mild  Daytime sleepiness: Within normal limits  Sleep quality: Elevated. The patient reported difficulties initiating and sustaining sleep, which she attributed to using the bathroom, not being able to breathe comfortably, feeling too cold, and bad dreams.     TIME:  Clinical Interview: 1000 - 1030 = 30 minutes  Testing and scoring by provider: 16 minutes  Testing by technician: 5656 - 6073 = 180 minutes  Scoring by technician: 30 minutes    BILLING:  93865 x 1 Unit  96136 x 1 Unit  96138 x 1 Unit  96139 x 6 Units

## 2022-11-14 ENCOUNTER — OFFICE VISIT (OUTPATIENT)
Dept: NEUROLOGY | Age: 82
End: 2022-11-14
Payer: MEDICARE

## 2022-11-14 DIAGNOSIS — G30.1 MILD LATE ONSET ALZHEIMER'S DEMENTIA WITHOUT BEHAVIORAL DISTURBANCE, PSYCHOTIC DISTURBANCE, MOOD DISTURBANCE, OR ANXIETY (HCC): Primary | ICD-10-CM

## 2022-11-14 DIAGNOSIS — F02.A0 MILD LATE ONSET ALZHEIMER'S DEMENTIA WITHOUT BEHAVIORAL DISTURBANCE, PSYCHOTIC DISTURBANCE, MOOD DISTURBANCE, OR ANXIETY (HCC): Primary | ICD-10-CM

## 2022-11-14 PROCEDURE — 96133 NRPSYC TST EVAL PHYS/QHP EA: CPT | Performed by: CLINICAL NEUROPSYCHOLOGIST

## 2022-11-14 PROCEDURE — 96132 NRPSYC TST EVAL PHYS/QHP 1ST: CPT | Performed by: CLINICAL NEUROPSYCHOLOGIST

## 2022-11-14 NOTE — PROGRESS NOTES
Prior to seeing the patient I reviewed pertinent records, including the previously completed report, the records in [facility], and any updated visits from other providers since the patient's last visit. Today, I engaged in a psychoeducational and supportive feedback session with the patient. I provided psychotherapy in the form of psychoeducation and support with respect to the results of the recent Neuropsychological Evaluation, including discussing individual tests as well as patient's areas of neurocognitive strength versus weakness. We discussed, in detail, the following:  Testing revealed no significant decline in cognitive functioning across several domains  Compared to the previous evaluation (2019), decline was demonstrated on measures of executive functioning, learning, and memory. The patient now meets criteria for mild dementia given his reported difficulties with complex instrumental activities of daily living  Reviewed recommendations outlined in report dated 10/27/2022  Answered questions to the best of my ability    Education was provided regarding my diagnostic impressions, and we discussed treatment plan/options. I also answered numerous questions related to the clinical findings, including discussing various methods to improve cognition and mood. Supportive/Cognitive Behavioral/Solution Focused psychotherapy provided. The patient needs to:    Follow-up with referring provider for ongoing management  Utilize practical compensatory strategies for memory weaknesses  Emphasize modifiable protective behaviors for cognitive decline (e.g., exercise, diet, and cognitive stimulation  Contact local supports  Follow-up in 12 months    The patient had the following concerns which I deferred to their referring provider:   Updated lab work and brain imaging      TIME:  Clinical Interview: 1000 - 1030 = 30 minutes  Testing and scoring by provider: 16 minutes  Testing by technician: 2337 - 9268 = 180 minutes  Scoring by technician: 30 minutes  Neuropsychological testing evaluation services*: 157 minutes + 35 minutes feedback = 192 minutes total    BILLING:  07323 x 1 Unit  96136 x 1 Unit  96138 x 1 Unit  96139 x 6 Units  96132 x 1 Unit  96133 x 2 Units    *Neuropsychological testing evaluation services include: Integration of patient data, interpretation of standardized test results and clinical data, clinical decision-making, treatment planning and report, and interactive feedback to the patient, family member(s) or caregiver(s), when performed.

## 2023-01-12 ENCOUNTER — OFFICE VISIT (OUTPATIENT)
Dept: NEUROLOGY | Age: 83
End: 2023-01-12
Payer: MEDICARE

## 2023-01-12 VITALS
HEIGHT: 73 IN | OXYGEN SATURATION: 96 % | WEIGHT: 180.2 LBS | BODY MASS INDEX: 23.88 KG/M2 | DIASTOLIC BLOOD PRESSURE: 80 MMHG | RESPIRATION RATE: 16 BRPM | HEART RATE: 100 BPM | SYSTOLIC BLOOD PRESSURE: 120 MMHG | TEMPERATURE: 97.8 F

## 2023-01-12 DIAGNOSIS — I67.89 CEREBRAL MICROVASCULAR DISEASE: ICD-10-CM

## 2023-01-12 DIAGNOSIS — F02.A0 MILD LATE ONSET ALZHEIMER'S DEMENTIA WITHOUT BEHAVIORAL DISTURBANCE, PSYCHOTIC DISTURBANCE, MOOD DISTURBANCE, OR ANXIETY (HCC): Primary | ICD-10-CM

## 2023-01-12 DIAGNOSIS — G47.9 SLEEP DISTURBANCE: ICD-10-CM

## 2023-01-12 DIAGNOSIS — R25.9 PARKINSONIAN FEATURES: ICD-10-CM

## 2023-01-12 DIAGNOSIS — G30.1 MILD LATE ONSET ALZHEIMER'S DEMENTIA WITHOUT BEHAVIORAL DISTURBANCE, PSYCHOTIC DISTURBANCE, MOOD DISTURBANCE, OR ANXIETY (HCC): Primary | ICD-10-CM

## 2023-01-12 DIAGNOSIS — F43.22 ADJUSTMENT DISORDER WITH ANXIOUS MOOD: ICD-10-CM

## 2023-01-12 PROCEDURE — 1123F ACP DISCUSS/DSCN MKR DOCD: CPT | Performed by: NURSE PRACTITIONER

## 2023-01-12 PROCEDURE — 99214 OFFICE O/P EST MOD 30 MIN: CPT | Performed by: NURSE PRACTITIONER

## 2023-01-12 NOTE — LETTER
1/12/2023    Patient: Karuna Castro   YOB: 1940   Date of Visit: 1/12/2023     Ryan Flannery MD  [address] [address]  Via Fax: [phone]    Dear Ryan Flannery MD,      Thank you for referring Mr. Serina Baer to [address] [place] [address] for evaluation. My notes for this consultation are attached. If you have questions, please do not hesitate to call me. I look forward to following your patient along with you.       Sincerely,    Lashell Hill NP

## 2023-01-12 NOTE — PROGRESS NOTES
[facility]  [facility] [address] [place] [address]  Maria Elena Ventura  Tel: [phone]  Fax: [phone]      Date:  23     Name:  Trung Watt  :  1940  MRN:  [id number]     PCP:  Viktor Kidd MD    Chief Complaint   Patient presents with    Follow-up     Mild neurocognitive disorder review Dr Guillermo Samano encounter       HISTORY OF PRESENT ILLNESS:  Patient presents today for regular follow up. Since he was last seen patient did complete neuropsych testing, which did note mild dementia as well as adjustment disorder with anxiety. Today his main concerns: memory loss, not sleeping well (TV stays on). Notes a lot things have been going on, wife is having a hard time, parkinsons. Wife does a lot of the housework, gets premade foods a lot from the grocery store, very little cooking. Pt writes all of his meds down, forgetting to take his meds at night a few times  Goes to [facility] 3 x week. No falls. No smoking. Eating Well. Drinks water. PCP increased Aricept to 10mg in November: Patient does not feel as though his memory is worse however he does note over the last couple months he has had diarrhea. Recap from last visit 2022: 1. Memory loss: Patient's last neuropsych testing completed in 2019, will order another test, modify his plan based on results. We will also request records from his primary care Dr. Alee Olmstead as it appears that he is recently started the patient on Aricept. Therefore at this time I will defer any new medications otherwise encourage patient to use a pillbox, write things down etc. Healthy lifestyle encouraged, including good sleep-wake cycle. -     REFERRAL TO NEUROPSYCHOLOGY  2. Idiopathic progressive neuropathy: Appears to be noncontributory at this time, patient is to continue monitor signs and symptoms. Fall precautions recommended. 3. Mild neurocognitive disorder:  We will reorder neuropsych testing as this was last done in 2019, based upon results can modify plan of care. -     REFERRAL TO NEUROPSYCHOLOGY  4. Parkinsonian features: Patient is taking the Requip, at one-point it appears the patient was on Sinemet but it was held, will defer adding any new medications at this time. Patient is to continue with regular activity and movement is much as tolerated. Monitor for any worsening signs or symptoms.  -     REFERRAL TO NEUROPSYCHOLOGY  5. Cerebral microvascular disease: Healthy lifestyle encouraged, update imaging as needed for worsening signs and symptoms. Follow-up with primary care for ongoing management of cholesterol etc.    REVIEW OF SYSTEMS:     Review of Systems   Gastrointestinal:  Positive for diarrhea (over the last 3-4 month). Genitourinary:  Positive for frequency. Musculoskeletal:  Negative for falls. Neurological:  Negative for dizziness, tingling, tremors, sensory change and headaches. Psychiatric/Behavioral:  Positive for memory loss. Negative for hallucinations. The patient has insomnia. All other systems reviewed and are negative. Current Outpatient Medications   Medication Sig    rOPINIRole (REQUIP XL) 2 mg Tb24 extended release tablet Take 1 Tablet by mouth daily. donepeziL (ARICEPT) 5 mg tablet TAKE 1 TABLET BY MOUTH EVERY DAY AT BEDTIME FOR 30 DAYS    melatonin 10 mg tab Take  by mouth.    mirtazapine (REMERON) 15 mg tablet Take 15 mg by mouth nightly. valsartan-hydrochlorothiazide (DIOVAN-HCT) 160-12.5 mg per tablet Take 1 tablet by mouth daily. atorvastatin (LIPITOR) 40 mg tablet Take 40 mg by mouth daily. No current facility-administered medications for this visit.      No Known Allergies  Past Medical History:   Diagnosis Date    Diabetes ([facility] [address]     Hypertension     Ill-defined condition     high cholesterol     Past Surgical History:   Procedure Laterality Date    HX CATARACT REMOVAL  2022    right eye    HX UROLOGICAL      surgeries for frequent urination     Social History Socioeconomic History    Marital status:      Spouse name: Not on file    Number of children: Not on file    Years of education: Not on file    Highest education level: Not on file   Occupational History    Not on file   Tobacco Use    Smoking status: Never    Smokeless tobacco: Never   Vaping Use    Vaping Use: Never used   Substance and Sexual Activity    Alcohol use: Yes     Comment: 1 etoh drink/day    Drug use: No    Sexual activity: Not on file   Other Topics Concern    Not on file   Social History Narrative    Not on file     Social Determinants of Health     Financial Resource Strain: Not on file   Food Insecurity: Not on file   Transportation Needs: Not on file   Physical Activity: Not on file   Stress: Not on file   Social Connections: Not on file   Intimate Partner Violence: Not on file   Housing Stability: Not on file     Family History   Problem Relation Age of Onset    Mental Retardation Daughter      INDICATION: amnesia     EXAMINATION:  MRI BRAIN WO CONTRAST     COMPARISON:  10/24/17     TECHNIQUE:  Multiplanar multisequence acquisition without contrast of the brain. FINDINGS:       Ventricles:  Midline, no hydrocephalus. Brain Parenchyma/Brainstem:  Generalized atrophy, and mild focal  encephalomalacia anterior right temporal lobe. No acute infarction. Mild chronic  microvascular ischemic disease in the supratentorial brain. Intracranial Hemorrhage:  None. Basal Cisterns:  Normal.   Flow Voids:  Normal.  Additional Comments:  N/A. IMPRESSION  Generalized volume loss and right temporal lobe encephalomalacia. Chronic  microvascular ischemic disease with no acute process.     PHYSICAL EXAMINATION:    Visit Vitals  /80 (BP 1 Location: Right upper arm, BP Patient Position: Sitting, BP Cuff Size: Adult)   Pulse 100   Temp 97.8 °F (36.6 °C) (Temporal)   Resp 16   Ht 6' 1\" (1.854 m)   Wt 180 lb 3.2 oz (81.7 kg)   SpO2 96%   BMI 23.77 kg/m²     General:  Well defined, nourished, and well groomed individual in no acute distress. Musculoskeletal:  Extremities revealed no edema and had full range of motion of joints. Psych:  Good mood and bright affect    NEUROLOGICAL EXAMINATION:     Mental Status:   Alert and oriented to person, place, and time with recent and remote memory intact. Attention span and concentration are normal. Clear speech. Fund of knowledge preserved. Pt was able to spell world correctly backwards. Word Recall: 0/3. Cranial Nerves:   PERRLA. Visual fields were full  EOM: no evidence of nystagmus  Facial sensation:  normal and symmetric  Facial motor: normal and symmetric, no facial droop noted. Hearing intact  SCM strength intact  Tongue: midline without fasciculations    Motor Examination: Normal tone. 5/5 muscle strength in bilateral upper extremities. Mild cogwheel rigidity. No muscle wasting, no twitching or fasciculation noted. Sensory exam:  Normal throughout to light touch in BUE. Coordination:   Finger to nose and rapid arm movement testing was normal.  No resting or intention tremor. Negative Romberg, negative pronator drift. Gait and Station:  Shuffling steps noted, relatively normal arm swing. Reflexes:  DTRs 1+ in bilateral biceps, brachioradialis, patella . ASSESSMENT AND PLAN      ICD-10-CM ICD-9-CM    1. Mild late onset Alzheimer's dementia without behavioral disturbance, psychotic disturbance, mood disturbance, or anxiety ([facility])  G30.1 331.0     F02. A0 294.10       2. Adjustment disorder with anxious mood  F43.22 309.24       3. Sleep disturbance  G47.9 780.50       4. Parkinsonian features  R25.9 781.0       5. Cerebral microvascular disease  I67.89 437.8         1.  Mild late onset Alzheimer's dementia without behavioral disturbance, psychotic disturbance, mood disturbance, or anxiety ([facility] Neuropsych testing results reviewed with patient, we will started the patient on 5 mg Aricept ,it appears that his primary care increased him to 10 mg, however, due to complaints of diarrhea, I would recommend patient decrease to 5 mg daily. Discussed care planning recommendations as he notes his wife has Parkinson's, advised that due to progression of this disease they may need to plan accordingly. Healthy lifestyle was encouraged. 2. Adjustment disorder with anxious mood: Neuropsych testing results reviewed, patient to discuss further with primary care. 3. Sleep disturbance: Sleep hygiene encouraged, defer any supplemental medication as patient is already prescribed Remeron, Requip as well as melatonin are on his medication list.  4. Parkinsonian features: Defer any supplemental medication at this time, patient has been fairly well controlled with Requip as previously prescribed by Dr. Kaye Ceballos, he is to continue as prescribed. Patient denies any major tremors or shakiness he denies significant issues with freezing/locking up. He is continuing with regular exercise. 5. Cerebral microvascular disease: Consider updated brain MRI if any worsening of memory and cognition. Healthy lifestyle was encouraged, follow-up with primary care. Patient and/or family verbalized understand of all instructions and all questions/concerns were addressed. Safety/side effects of medications discussed. Patient remains a complex patient secondary to polypharmacy, significant comorbid conditions, and use of high-risk medications which complicate the decision making process related to patient's neurologic diagnosis. We will see the patient back in approximately 6 to 9 months, sooner if needed.     NITHIN Lugo-BC

## 2023-01-12 NOTE — PROGRESS NOTES
Chief Complaint   Patient presents with    Follow-up     Mild neurocognitive disorder review Dr Neville Baird encounter     1. Have you been to the ER, urgent care clinic since your last visit? No Hospitalized since your last visit? No     2. Have you seen or consulted any other health care providers outside of the [address] [place] [address] [place] since your last visit? No Include any pap smears or colon screening. No    Patient is not sure of medications and dosage having sleeping concerns at night. Has concerns over memory still driving.

## 2023-01-12 NOTE — PATIENT INSTRUCTIONS
Due to complaints of diarrhea, I would recommend lowering your Aricept (Donepezil) back to 5mg. Your neuropsych testing did show mild dementia. Work on your sleep habits, limit caffeine, limit TV at night. Try to go to sleep at the same time every night. We will see you back in 6 months, sooner if needed.

## 2023-08-17 ENCOUNTER — OFFICE VISIT (OUTPATIENT)
Age: 83
End: 2023-08-17
Payer: COMMERCIAL

## 2023-08-17 VITALS
OXYGEN SATURATION: 98 % | SYSTOLIC BLOOD PRESSURE: 138 MMHG | HEART RATE: 81 BPM | DIASTOLIC BLOOD PRESSURE: 80 MMHG | RESPIRATION RATE: 18 BRPM | HEIGHT: 73 IN | BODY MASS INDEX: 23.06 KG/M2 | TEMPERATURE: 97.7 F | WEIGHT: 174 LBS

## 2023-08-17 DIAGNOSIS — F02.A0 MILD LATE ONSET ALZHEIMER'S DEMENTIA WITHOUT BEHAVIORAL DISTURBANCE, PSYCHOTIC DISTURBANCE, MOOD DISTURBANCE, OR ANXIETY (HCC): Primary | ICD-10-CM

## 2023-08-17 DIAGNOSIS — G47.9 SLEEP DISORDER: ICD-10-CM

## 2023-08-17 DIAGNOSIS — G30.1 MILD LATE ONSET ALZHEIMER'S DEMENTIA WITHOUT BEHAVIORAL DISTURBANCE, PSYCHOTIC DISTURBANCE, MOOD DISTURBANCE, OR ANXIETY (HCC): Primary | ICD-10-CM

## 2023-08-17 DIAGNOSIS — G62.9 NEUROPATHY: ICD-10-CM

## 2023-08-17 PROCEDURE — 99214 OFFICE O/P EST MOD 30 MIN: CPT | Performed by: NURSE PRACTITIONER

## 2023-08-17 PROCEDURE — 1123F ACP DISCUSS/DSCN MKR DOCD: CPT | Performed by: NURSE PRACTITIONER

## 2023-08-17 RX ORDER — LACTULOSE 10 G/15ML
SOLUTION ORAL
COMMUNITY
Start: 2023-04-28

## 2023-08-17 RX ORDER — MEMANTINE HYDROCHLORIDE 5 MG/1
5 TABLET ORAL 2 TIMES DAILY
Qty: 60 TABLET | Refills: 3 | Status: SHIPPED | OUTPATIENT
Start: 2023-08-17

## 2023-08-17 RX ORDER — DONEPEZIL HYDROCHLORIDE 10 MG/1
10 TABLET, FILM COATED ORAL NIGHTLY
COMMUNITY

## 2023-08-17 ASSESSMENT — PATIENT HEALTH QUESTIONNAIRE - PHQ9
2. FEELING DOWN, DEPRESSED OR HOPELESS: 0
SUM OF ALL RESPONSES TO PHQ QUESTIONS 1-9: 0
1. LITTLE INTEREST OR PLEASURE IN DOING THINGS: 0
SUM OF ALL RESPONSES TO PHQ QUESTIONS 1-9: 0
SUM OF ALL RESPONSES TO PHQ9 QUESTIONS 1 & 2: 0
SUM OF ALL RESPONSES TO PHQ QUESTIONS 1-9: 0
SUM OF ALL RESPONSES TO PHQ QUESTIONS 1-9: 0

## 2023-08-17 ASSESSMENT — ENCOUNTER SYMPTOMS
DIARRHEA: 0
BACK PAIN: 0

## 2023-08-17 NOTE — PROGRESS NOTES
[address] [facility]  [address] [facility] [address] [facility], [facility] [address]  Tel: [phone]  Fax: [phone]      Date:  23     Name:  Thang Saeed  :  1940  MRN:  [id number]     PCP:  Kalpana Frank MD    Chief Complaint   Patient presents with    Memory Loss     Did lose his drivers license due to memory loss - memory has worsened - forgets things quickly at times     Neurologic Problem     Neuropathy        HISTORY OF PRESENT ILLNESS:  Patient presents today for regular follow up for his memory. Last seen 2023. He does feel like his memory has worsened. Forgets things quickly, writes things down. Long term memory is still ok. He is no longer driving. Lost his license. No longer going to the [facility] anymore as a result. For transportation he either uses [employer] transit or friend helps take him to the store. He has 2 sons, one lives in [facility], recently visited. He helps with finances, other son is about 1 hour away in [place]. Who comes and visits checks on him. Has someone come clean his house. Limited cooking, he eats a lot of prepared sandwich, uses the microwave for other meals. Enjoys yardwork, watches a lot of tv. Sweeps the drive way. Enjoys reading and staying up with current events. Recap from last visit 1. Mild late onset Alzheimer's dementia without behavioral disturbance, psychotic disturbance, mood disturbance, or anxiety ([address]): Neuropsych testing results reviewed with patient, we will started the patient on 5 mg Aricept ,it appears that his primary care increased him to 10 mg, however, due to complaints of diarrhea, I would recommend patient decrease to 5 mg daily. Discussed care planning recommendations as he notes his wife has Parkinson's, advised that due to progression of this disease they may need to plan accordingly. Healthy lifestyle was encouraged.   2. Adjustment disorder with anxious mood: Neuropsych testing results reviewed, patient to

## 2023-08-17 NOTE — PROGRESS NOTES
Chief Complaint   Patient presents with    Memory Loss     Did lose his drivers license due to memory loss - memory has worsened - forgets things quickly at times     Neurologic Problem     Neuropathy      1. Have you been to the ER, urgent care clinic since your last visit? Hospitalized since your last visit? No     2. Have you seen or consulted any other health care providers outside of the [address] [place] [address] Avenue since your last visit? Include any pap smears or colon screening.   No

## 2023-08-21 RX ORDER — ROPINIROLE 2 MG/1
TABLET, FILM COATED, EXTENDED RELEASE ORAL
Qty: 90 TABLET | Refills: 3 | Status: SHIPPED | OUTPATIENT
Start: 2023-08-21

## 2023-09-13 DIAGNOSIS — F02.A0 MILD LATE ONSET ALZHEIMER'S DEMENTIA WITHOUT BEHAVIORAL DISTURBANCE, PSYCHOTIC DISTURBANCE, MOOD DISTURBANCE, OR ANXIETY (HCC): ICD-10-CM

## 2023-09-13 DIAGNOSIS — G30.1 MILD LATE ONSET ALZHEIMER'S DEMENTIA WITHOUT BEHAVIORAL DISTURBANCE, PSYCHOTIC DISTURBANCE, MOOD DISTURBANCE, OR ANXIETY (HCC): ICD-10-CM

## 2023-09-13 NOTE — TELEPHONE ENCOUNTER
Received fax from "[employer] requesting 90 day supply on memantine.  Last filled on 8/17/23 # 60 with 3 refills

## 2023-09-14 RX ORDER — MEMANTINE HYDROCHLORIDE 5 MG/1
5 TABLET ORAL 2 TIMES DAILY
Qty: 180 TABLET | Refills: 1 | Status: SHIPPED | OUTPATIENT
Start: 2023-09-14

## 2023-10-24 NOTE — PROGRESS NOTES
extended release tablet, TAKE 1 TABLET BY MOUTH EVERY DAY, Disp: 90 tablet, Rfl: 3    memantine (NAMENDA) 5 MG tablet, Take 1 tablet by mouth 2 times daily, Disp: 180 tablet, Rfl: 1    lactulose (CHRONULAC) 10 GM/15ML solution, 15 milliliters Orally Once a day for 30 days, Disp: , Rfl:     donepezil (ARICEPT) 10 MG tablet, Take 1 tablet by mouth nightly, Disp: , Rfl:     atorvastatin (LIPITOR) 40 MG tablet, Take 1 tablet by mouth daily, Disp: , Rfl:     mirtazapine (REMERON) 15 MG tablet, Take 1 tablet by mouth, Disp: , Rfl:     Pertinent Neurological History:  Seizure: Never  Stroke: Never  TBI: Never    Neurodiagnostic Findings:  Imaging: MRI brain (6/22/2021) revealed \"generalized volume loss and right temporal lobe encephalomalacia. Chronic microvascular ischemic disease with no acute process. 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  Service: 2 years in the [employer]  Relationship Status:  61 years  Children: 3 children  Housing: Private residence with wife  Legal: Denied    Substance Use:  Alcohol: 1 glass of wine per day  Nicotine: Denied  Recreational/Illicit Substances: Denied  Treatment: Denied    BEHAVIORAL OBSERVATIONS:  Appearance: Casually dressed, Well-groomed, and Appeared stated age  Orientation: Correctly identified the year, month, and day of the week. Misidentified the date by 4 days. Misidentified the town. Oriented to person. Difficulty recalling recent events.   Motor: Ambulated with a cane but generally adequate gait, Adequate posture, No involuntary movements, and Adequate strength  Thought Processes: Clear and

## 2023-10-25 ENCOUNTER — PROCEDURE VISIT (OUTPATIENT)
Age: 83
End: 2023-10-25
Payer: MEDICARE

## 2023-10-25 ENCOUNTER — OFFICE VISIT (OUTPATIENT)
Age: 83
End: 2023-10-25
Payer: MEDICARE

## 2023-10-25 DIAGNOSIS — R41.3 MEMORY LOSS: Primary | ICD-10-CM

## 2023-10-25 DIAGNOSIS — F02.A0 MILD LATE ONSET ALZHEIMER'S DEMENTIA WITHOUT BEHAVIORAL DISTURBANCE, PSYCHOTIC DISTURBANCE, MOOD DISTURBANCE, OR ANXIETY (HCC): Primary | ICD-10-CM

## 2023-10-25 DIAGNOSIS — G30.1 MILD LATE ONSET ALZHEIMER'S DEMENTIA WITHOUT BEHAVIORAL DISTURBANCE, PSYCHOTIC DISTURBANCE, MOOD DISTURBANCE, OR ANXIETY (HCC): Primary | ICD-10-CM

## 2023-10-25 DIAGNOSIS — F43.21 ADJUSTMENT DISORDER WITH DEPRESSED MOOD: ICD-10-CM

## 2023-10-25 PROCEDURE — 96136 PSYCL/NRPSYC TST PHY/QHP 1ST: CPT | Performed by: CLINICAL NEUROPSYCHOLOGIST

## 2023-10-25 PROCEDURE — 1036F TOBACCO NON-USER: CPT | Performed by: CLINICAL NEUROPSYCHOLOGIST

## 2023-10-25 PROCEDURE — 1123F ACP DISCUSS/DSCN MKR DOCD: CPT | Performed by: CLINICAL NEUROPSYCHOLOGIST

## 2023-10-25 PROCEDURE — 96138 PSYCL/NRPSYC TECH 1ST: CPT | Performed by: CLINICAL NEUROPSYCHOLOGIST

## 2023-10-25 PROCEDURE — 90791 PSYCH DIAGNOSTIC EVALUATION: CPT | Performed by: CLINICAL NEUROPSYCHOLOGIST

## 2023-10-25 PROCEDURE — 96139 PSYCL/NRPSYC TST TECH EA: CPT | Performed by: CLINICAL NEUROPSYCHOLOGIST

## 2023-11-27 NOTE — PROGRESS NOTES
Neuropsychological Evaluation Report      Patient Name: Harry Allen  YOB: 1940    Age: 82 y.o. Date of Intake: 10/25/2023   Education: 16 Date of Testing: 10/25/2023   Gender: Male Ethnicity: White     Referring Provider: MYRA Monroy     REASON FOR REFERRAL AND EVALUATION PROCEDURES:  Harry Allen  was referred for neuropsychological evaluation by his Neurology Provider to obtain a quantitative assessment of his current level of neurocognitive functioning, assist in differential diagnosis, and aid in individualized treatment planning. The patient understood the rationale and procedures for evaluation, as well as the limits to confidentiality, and they agreed to participate. The patient consented to have this report made available to his  treating providers through his  electronic medical records. This evaluation was completed with the patient by Sushma Saeed PsyD with the exception of testing by technician, which was completed by MAGGIE Trotter under the supervision of Dr. Little Mclean. History Sources: Patient, Medical Record, and Test Data    SUMMARY AND IMPRESSION:  The following section is a summary of the patient's pertinent test results and impressions. A more thorough review of the patient's background and test scores can be found below. Results of the patient's neuropsychological evaluation revealed an amnestic memory profile characterized by moderate impairment (~2 standard deviations below the mean) on measures of learning, moderate to advanced impairment (2-3 standard deviations below the mean) on measures of free recall, and minimal benefit from recognition/discrimination cueing. Expressive language is also noted for moderate to advanced impairment. Executive functioning and mental processing speed, though variable, were generally in the moderately impaired range. Attention/working memory and visuospatial functioning were within normal limits.   Simulated

## 2023-11-28 ENCOUNTER — OFFICE VISIT (OUTPATIENT)
Age: 83
End: 2023-11-28
Payer: MEDICARE

## 2023-11-28 DIAGNOSIS — G30.1 MILD LATE ONSET ALZHEIMER'S DEMENTIA WITHOUT BEHAVIORAL DISTURBANCE, PSYCHOTIC DISTURBANCE, MOOD DISTURBANCE, OR ANXIETY (HCC): Primary | ICD-10-CM

## 2023-11-28 DIAGNOSIS — F02.A0 MILD LATE ONSET ALZHEIMER'S DEMENTIA WITHOUT BEHAVIORAL DISTURBANCE, PSYCHOTIC DISTURBANCE, MOOD DISTURBANCE, OR ANXIETY (HCC): Primary | ICD-10-CM

## 2023-11-28 PROCEDURE — 96133 NRPSYC TST EVAL PHYS/QHP EA: CPT | Performed by: CLINICAL NEUROPSYCHOLOGIST

## 2023-11-28 PROCEDURE — 96132 NRPSYC TST EVAL PHYS/QHP 1ST: CPT | Performed by: CLINICAL NEUROPSYCHOLOGIST

## 2023-11-28 NOTE — PROGRESS NOTES
Prior to seeing the patient I reviewed pertinent records, including the previously completed report, the records in [address] [place] [address], and any updated visits from other providers since the patient's last visit. Today, I engaged in a psychoeducational and supportive feedback session with the patient. I provided psychotherapy in the form of psychoeducation and support with respect to the results of the recent Neuropsychological Evaluation, including discussing individual tests as well as patient's areas of neurocognitive strength versus weakness. We discussed, in detail, the following:  Discussed findings from the evaluation including test results, diagnosis, and suspected contributing factors  Reviewed recommendations outlined in report  Answered questions to the best of my ability    Education was provided regarding my diagnostic impressions, and we discussed treatment plan/options. I also answered numerous questions related to the clinical findings, including discussing various methods to improve cognition and mood. Supportive/Cognitive Behavioral/Solution Focused psychotherapy provided. The patient needs to: Follow-up with referring provider for ongoing management  Emphasize modifiable protective behaviors for cognitive functioning such as exercise, diet, and cognitive stimulation    TIME:  Clinical Interview: 1200 - 1220 = 20 minutes  Testing by technician: 3204 - 1522 = 163 minutes  Scoring by technician: 24 minutes  Neuropsychological testing evaluation services*: 203 minutes + 25 minutes feedback = 228 minutes total    BILLING:  58893 x 1 Unit  96138 x 1 Unit  96139 x 5 Units  96132 x 1 Unit  96133 x 3 Units    *Neuropsychological testing evaluation services include: Integration of patient data, interpretation of standardized test results and clinical data, clinical decision-making, treatment planning and report, and interactive feedback to the patient, family member(s) or caregiver(s), when performed.

## 2023-12-05 ENCOUNTER — TELEPHONE (OUTPATIENT)
Age: 83
End: 2023-12-05

## 2023-12-05 NOTE — TELEPHONE ENCOUNTER
Pt son, Deacon called requesting a call back pertaining to last appointment Annie had with Pt. Deacon stated Annie asked to speak with him.       Please call; [phone]

## 2024-01-04 DIAGNOSIS — F02.A0 MILD LATE ONSET ALZHEIMER'S DEMENTIA WITHOUT BEHAVIORAL DISTURBANCE, PSYCHOTIC DISTURBANCE, MOOD DISTURBANCE, OR ANXIETY (HCC): ICD-10-CM

## 2024-01-04 DIAGNOSIS — G30.1 MILD LATE ONSET ALZHEIMER'S DEMENTIA WITHOUT BEHAVIORAL DISTURBANCE, PSYCHOTIC DISTURBANCE, MOOD DISTURBANCE, OR ANXIETY (HCC): ICD-10-CM

## 2024-01-05 RX ORDER — MEMANTINE HYDROCHLORIDE 5 MG/1
5 TABLET ORAL 2 TIMES DAILY
Qty: 180 TABLET | Refills: 1 | Status: SHIPPED | OUTPATIENT
Start: 2024-01-05

## 2024-01-10 ENCOUNTER — APPOINTMENT (OUTPATIENT)
Facility: HOSPITAL | Age: 84
End: 2024-01-10
Payer: MEDICARE

## 2024-01-10 ENCOUNTER — HOSPITAL ENCOUNTER (EMERGENCY)
Facility: HOSPITAL | Age: 84
Discharge: HOME | End: 2024-01-10
Attending: FAMILY MEDICINE | Admitting: FAMILY MEDICINE
Payer: MEDICARE

## 2024-01-10 VITALS
SYSTOLIC BLOOD PRESSURE: 129 MMHG | BODY MASS INDEX: 25.77 KG/M2 | HEIGHT: 69 IN | DIASTOLIC BLOOD PRESSURE: 93 MMHG | WEIGHT: 174 LBS | HEART RATE: 79 BPM | TEMPERATURE: 98.1 F | RESPIRATION RATE: 18 BRPM | OXYGEN SATURATION: 100 %
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
## 2024-01-10 LAB
ALBUMIN SERPL-MCNC: 3.8 G/DL (ref 3.5–5)
ALBUMIN/GLOB SERPL: 1.2 (ref 1.1–2.2)
ALP SERPL-CCNC: 113 U/L (ref 45–117)
ALT SERPL-CCNC: 23 U/L (ref 12–78)
ANION GAP SERPL CALC-SCNC: 9 MMOL/L (ref 5–15)
APPEARANCE UR: CLEAR
AST SERPL-CCNC: 22 U/L (ref 15–37)
BACTERIA URNS QL MICRO: NEGATIVE /HPF
BASOPHILS # BLD: 0 K/UL (ref 0–0.1)
BASOPHILS NFR BLD: 0 % (ref 0–1)
BILIRUB SERPL-MCNC: 0.6 MG/DL (ref 0.2–1)
BILIRUB UR QL: NEGATIVE
BUN SERPL-MCNC: 14 MG/DL (ref 6–20)
BUN/CREAT SERPL: 14 (ref 12–20)
CALCIUM SERPL-MCNC: 8.8 MG/DL (ref 8.5–10.1)
CHLORIDE SERPL-SCNC: 101 MMOL/L (ref 97–108)
CK SERPL-CCNC: 113 U/L (ref 39–308)
CO2 SERPL-SCNC: 30 MMOL/L (ref 21–32)
COLOR UR: NORMAL
CREAT SERPL-MCNC: 1.02 MG/DL (ref 0.7–1.3)
DIFFERENTIAL METHOD BLD: ABNORMAL
EOSINOPHIL # BLD: 0 K/UL (ref 0–0.4)
EOSINOPHIL NFR BLD: 0 % (ref 0–7)
EPITH CASTS URNS QL MICRO: NORMAL /LPF
ERYTHROCYTE [DISTWIDTH] IN BLOOD BY AUTOMATED COUNT: 13.2 % (ref 11.5–14.5)
GLOBULIN SER CALC-MCNC: 3.2 G/DL (ref 2–4)
GLUCOSE SERPL-MCNC: 131 MG/DL (ref 65–100)
GLUCOSE UR STRIP.AUTO-MCNC: NEGATIVE MG/DL
HCT VFR BLD AUTO: 42.4 % (ref 36.6–50.3)
HGB BLD-MCNC: 13.6 G/DL (ref 12.1–17)
HGB UR QL STRIP: NEGATIVE
IMM GRANULOCYTES # BLD AUTO: 0 K/UL (ref 0–0.04)
IMM GRANULOCYTES NFR BLD AUTO: 0 % (ref 0–0.5)
KETONES UR QL STRIP.AUTO: NEGATIVE MG/DL
LEUKOCYTE ESTERASE UR QL STRIP.AUTO: NEGATIVE
LYMPHOCYTES # BLD: 0.6 K/UL (ref 0.8–3.5)
LYMPHOCYTES NFR BLD: 5 % (ref 12–49)
MCH RBC QN AUTO: 28.2 PG (ref 26–34)
MCHC RBC AUTO-ENTMCNC: 32.1 G/DL (ref 30–36.5)
MCV RBC AUTO: 87.8 FL (ref 80–99)
MONOCYTES # BLD: 0.6 K/UL (ref 0–1)
MONOCYTES NFR BLD: 5 % (ref 5–13)
NEUTS SEG # BLD: 10.3 K/UL (ref 1.8–8)
NEUTS SEG NFR BLD: 90 % (ref 32–75)
NITRITE UR QL STRIP.AUTO: NEGATIVE
NRBC # BLD: 0 K/UL (ref 0–0.01)
NRBC BLD-RTO: 0 PER 100 WBC
PH UR STRIP: 5.5 (ref 5–8)
PLATELET # BLD AUTO: 249 K/UL (ref 150–400)
PMV BLD AUTO: 9.5 FL (ref 8.9–12.9)
POTASSIUM SERPL-SCNC: 4.1 MMOL/L (ref 3.5–5.1)
PROT SERPL-MCNC: 7 G/DL (ref 6.4–8.2)
PROT UR STRIP-MCNC: NEGATIVE MG/DL
RBC # BLD AUTO: 4.83 M/UL (ref 4.1–5.7)
RBC #/AREA URNS HPF: NORMAL /HPF (ref 0–5)
RBC MORPH BLD: ABNORMAL
SODIUM SERPL-SCNC: 140 MMOL/L (ref 136–145)
SP GR UR REFRACTOMETRY: 1.02 (ref 1–1.03)
UROBILINOGEN UR QL STRIP.AUTO: 0.2 EU/DL (ref 0.2–1)
WBC # BLD AUTO: 11.5 K/UL (ref 4.1–11.1)
WBC URNS QL MICRO: NORMAL /HPF (ref 0–4)

## 2024-01-10 PROCEDURE — 36415 COLL VENOUS BLD VENIPUNCTURE: CPT

## 2024-01-10 PROCEDURE — 72170 X-RAY EXAM OF PELVIS: CPT

## 2024-01-10 PROCEDURE — 70450 CT HEAD/BRAIN W/O DYE: CPT

## 2024-01-10 PROCEDURE — 72125 CT NECK SPINE W/O DYE: CPT

## 2024-01-10 PROCEDURE — 85025 COMPLETE CBC W/AUTO DIFF WBC: CPT

## 2024-01-10 PROCEDURE — 2580000003 HC RX 258: Performed by: FAMILY MEDICINE

## 2024-01-10 PROCEDURE — 6360000002 HC RX W HCPCS: Performed by: FAMILY MEDICINE

## 2024-01-10 PROCEDURE — 82550 ASSAY OF CK (CPK): CPT

## 2024-01-10 PROCEDURE — 99284 EMERGENCY DEPT VISIT MOD MDM: CPT

## 2024-01-10 PROCEDURE — 81001 URINALYSIS AUTO W/SCOPE: CPT

## 2024-01-10 PROCEDURE — 72100 X-RAY EXAM L-S SPINE 2/3 VWS: CPT

## 2024-01-10 PROCEDURE — 90471 IMMUNIZATION ADMIN: CPT | Performed by: FAMILY MEDICINE

## 2024-01-10 PROCEDURE — 6370000000 HC RX 637 (ALT 250 FOR IP): Performed by: FAMILY MEDICINE

## 2024-01-10 PROCEDURE — 90715 TDAP VACCINE 7 YRS/> IM: CPT | Performed by: FAMILY MEDICINE

## 2024-01-10 PROCEDURE — 80053 COMPREHEN METABOLIC PANEL: CPT

## 2024-01-10 RX ORDER — ACETAMINOPHEN 325 MG/1
650 TABLET ORAL
Status: COMPLETED | OUTPATIENT
Start: 2024-01-10 | End: 2024-01-10

## 2024-01-10 RX ORDER — 0.9 % SODIUM CHLORIDE 0.9 %
1000 INTRAVENOUS SOLUTION INTRAVENOUS ONCE
Status: COMPLETED | OUTPATIENT
Start: 2024-01-10 | End: 2024-01-10

## 2024-01-10 RX ADMIN — ACETAMINOPHEN 650 MG: 325 TABLET ORAL at 04:29

## 2024-01-10 RX ADMIN — SODIUM CHLORIDE 1000 ML: 9 INJECTION, SOLUTION INTRAVENOUS at 07:12

## 2024-01-10 RX ADMIN — TETANUS TOXOID, REDUCED DIPHTHERIA TOXOID AND ACELLULAR PERTUSSIS VACCINE, ADSORBED 0.5 ML: 5; 2.5; 8; 8; 2.5 SUSPENSION INTRAMUSCULAR at 05:01

## 2024-01-10 ASSESSMENT — LIFESTYLE VARIABLES
HOW OFTEN DO YOU HAVE A DRINK CONTAINING ALCOHOL: NEVER
HOW MANY STANDARD DRINKS CONTAINING ALCOHOL DO YOU HAVE ON A TYPICAL DAY: PATIENT DOES NOT DRINK
HOW OFTEN DO YOU HAVE A DRINK CONTAINING ALCOHOL: NEVER
HOW MANY STANDARD DRINKS CONTAINING ALCOHOL DO YOU HAVE ON A TYPICAL DAY: PATIENT DOES NOT DRINK

## 2024-01-10 ASSESSMENT — PAIN SCALES - GENERAL
PAINLEVEL_OUTOF10: 3
PAINLEVEL_OUTOF10: 5
PAINLEVEL_OUTOF10: 5

## 2024-01-10 ASSESSMENT — PAIN - FUNCTIONAL ASSESSMENT: PAIN_FUNCTIONAL_ASSESSMENT: 0-10

## 2024-01-10 ASSESSMENT — PAIN DESCRIPTION - PAIN TYPE: TYPE: ACUTE PAIN

## 2024-01-10 ASSESSMENT — PAIN DESCRIPTION - LOCATION
LOCATION: BACK
LOCATION: BACK

## 2024-01-10 ASSESSMENT — PAIN DESCRIPTION - ORIENTATION: ORIENTATION: LEFT

## 2024-01-10 NOTE — ED TRIAGE NOTES
Presents via ems following GLF at home. Patient was outside for unknown amount of time. Reports hitting back of head and currently c/o L lower back pain.

## 2024-01-10 NOTE — ED NOTES
Discharge instructions reviewed with patient. Opportunity for questions was provided. All questions answered.

## 2024-01-10 NOTE — ED PROVIDER NOTES
Total Bilirubin 0.6 0.2 - 1.0 MG/DL    ALT 23 12 - 78 U/L    AST 22 15 - 37 U/L    Alk Phosphatase 113 45 - 117 U/L    Total Protein 7.0 6.4 - 8.2 g/dL    Albumin 3.8 3.5 - 5.0 g/dL    Globulin 3.2 2.0 - 4.0 g/dL    Albumin/Globulin Ratio 1.2 1.1 - 2.2     CK    Collection Time: 01/10/24  4:19 AM   Result Value Ref Range    Total  39 - 308 U/L   Urinalysis with Microscopic    Collection Time: 01/10/24  5:16 AM   Result Value Ref Range    Color, UA YELLOW/STRAW      Appearance CLEAR CLEAR      Specific Gravity, UA 1.025 1.003 - 1.030      pH, Urine 5.5 5.0 - 8.0      Protein, UA Negative NEG mg/dL    Glucose, UA Negative NEG mg/dL    Ketones, Urine Negative NEG mg/dL    Bilirubin Urine Negative NEG      Blood, Urine Negative NEG      Urobilinogen, Urine 0.2 0.2 - 1.0 EU/dL    Nitrite, Urine Negative NEG      Leukocyte Esterase, Urine Negative NEG      WBC, UA 0-4 0 - 4 /hpf    RBC, UA 0-5 0 - 5 /hpf    Epithelial Cells UA FEW FEW /lpf    BACTERIA, URINE Negative NEG /hpf              RADIOLOGY:  Non-plain film images such as CT, Ultrasound and MRI are read by the radiologist. Plain radiographic images are visualized and preliminarily interpreted by the ED Provider with the below findings:     Xray pelvis, lumbar spine negative      Interpretation per the Radiologist below, if available at the time of this note:     CT Head W/O Contrast   Final Result   No acute intracranial abnormality.            CT CSpine W/O Contrast   Final Result   No evidence of fracture or subluxation.      XR PELVIS (1-2 VIEWS)   Final Result   No acute fractures identified      XR LUMBAR SPINE (2-3 VIEWS)   Final Result   No evidence of acute fracture                          PROCEDURES   Unless otherwise noted below, none  Procedures       CRITICAL CARE TIME   0    SCREENINGS   NIH Stroke Score       Heart Score       Curb-65          EMERGENCY DEPARTMENT COURSE and DIFFERENTIAL DIAGNOSIS/MDM   Vitals:    Vitals:    01/10/24 0430

## 2024-01-10 NOTE — ED NOTES
Instructions provided to patient in regards to discharge. Patient verbalized understanding. No questions at time of discharge. Ambulated out without any difficulty. Vital signs stable at time of discharge.

## 2024-07-15 ENCOUNTER — OFFICE VISIT (OUTPATIENT)
Age: 84
End: 2024-07-15

## 2024-07-15 VITALS
WEIGHT: 159 LBS | SYSTOLIC BLOOD PRESSURE: 120 MMHG | HEART RATE: 56 BPM | BODY MASS INDEX: 21.07 KG/M2 | HEIGHT: 73 IN | TEMPERATURE: 97.7 F | OXYGEN SATURATION: 97 % | DIASTOLIC BLOOD PRESSURE: 70 MMHG

## 2024-07-15 DIAGNOSIS — F51.01 PRIMARY INSOMNIA: ICD-10-CM

## 2024-07-15 DIAGNOSIS — G30.1 MILD LATE ONSET ALZHEIMER'S DEMENTIA WITHOUT BEHAVIORAL DISTURBANCE, PSYCHOTIC DISTURBANCE, MOOD DISTURBANCE, OR ANXIETY (HCC): Primary | ICD-10-CM

## 2024-07-15 DIAGNOSIS — F02.A0 MILD LATE ONSET ALZHEIMER'S DEMENTIA WITHOUT BEHAVIORAL DISTURBANCE, PSYCHOTIC DISTURBANCE, MOOD DISTURBANCE, OR ANXIETY (HCC): Primary | ICD-10-CM

## 2024-07-15 DIAGNOSIS — Z91.81 AT HIGH RISK FOR FALLS: ICD-10-CM

## 2024-07-15 DIAGNOSIS — G20.B2 PARKINSON'S DISEASE WITH DYSKINESIA AND FLUCTUATING MANIFESTATIONS (HCC): ICD-10-CM

## 2024-07-15 DIAGNOSIS — Z86.39 HISTORY OF ELEVATED GLUCOSE: ICD-10-CM

## 2024-07-15 DIAGNOSIS — F02.A4 DEMENTIA IN OTHER DISEASES CLASSIFIED ELSEWHERE, MILD, WITH ANXIETY (HCC): ICD-10-CM

## 2024-07-15 DIAGNOSIS — Z76.89 ENCOUNTER TO ESTABLISH CARE: ICD-10-CM

## 2024-07-15 SDOH — ECONOMIC STABILITY: FOOD INSECURITY: WITHIN THE PAST 12 MONTHS, THE FOOD YOU BOUGHT JUST DIDN'T LAST AND YOU DIDN'T HAVE MONEY TO GET MORE.: NEVER TRUE

## 2024-07-15 SDOH — ECONOMIC STABILITY: INCOME INSECURITY: IN THE LAST 12 MONTHS, WAS THERE A TIME WHEN YOU WERE NOT ABLE TO PAY THE MORTGAGE OR RENT ON TIME?: NO

## 2024-07-15 SDOH — ECONOMIC STABILITY: FOOD INSECURITY: WITHIN THE PAST 12 MONTHS, YOU WORRIED THAT YOUR FOOD WOULD RUN OUT BEFORE YOU GOT MONEY TO BUY MORE.: NEVER TRUE

## 2024-07-15 SDOH — ECONOMIC STABILITY: HOUSING INSECURITY: IN THE LAST 12 MONTHS, HOW MANY PLACES HAVE YOU LIVED?: 1

## 2024-07-15 SDOH — HEALTH STABILITY: PHYSICAL HEALTH: ON AVERAGE, HOW MANY MINUTES DO YOU ENGAGE IN EXERCISE AT THIS LEVEL?: 10 MIN

## 2024-07-15 SDOH — ECONOMIC STABILITY: HOUSING INSECURITY
IN THE LAST 12 MONTHS, WAS THERE A TIME WHEN YOU DID NOT HAVE A STEADY PLACE TO SLEEP OR SLEPT IN A SHELTER (INCLUDING NOW)?: NO

## 2024-07-15 SDOH — HEALTH STABILITY: PHYSICAL HEALTH: ON AVERAGE, HOW MANY DAYS PER WEEK DO YOU ENGAGE IN MODERATE TO STRENUOUS EXERCISE (LIKE A BRISK WALK)?: 7 DAYS

## 2024-07-15 SDOH — ECONOMIC STABILITY: TRANSPORTATION INSECURITY
IN THE PAST 12 MONTHS, HAS THE LACK OF TRANSPORTATION KEPT YOU FROM MEDICAL APPOINTMENTS OR FROM GETTING MEDICATIONS?: NO

## 2024-07-15 SDOH — ECONOMIC STABILITY: TRANSPORTATION INSECURITY
IN THE PAST 12 MONTHS, HAS LACK OF TRANSPORTATION KEPT YOU FROM MEETINGS, WORK, OR FROM GETTING THINGS NEEDED FOR DAILY LIVING?: NO

## 2024-07-15 ASSESSMENT — SOCIAL DETERMINANTS OF HEALTH (SDOH)
WITHIN THE LAST YEAR, HAVE YOU BEEN HUMILIATED OR EMOTIONALLY ABUSED IN OTHER WAYS BY YOUR PARTNER OR EX-PARTNER?: NO
WITHIN THE LAST YEAR, HAVE TO BEEN RAPED OR FORCED TO HAVE ANY KIND OF SEXUAL ACTIVITY BY YOUR PARTNER OR EX-PARTNER?: NO
HOW OFTEN DO YOU ATTEND CHURCH OR RELIGIOUS SERVICES?: MORE THAN 4 TIMES PER YEAR
HOW HARD IS IT FOR YOU TO PAY FOR THE VERY BASICS LIKE FOOD, HOUSING, MEDICAL CARE, AND HEATING?: NOT HARD AT ALL
HOW OFTEN DO YOU ATTENT MEETINGS OF THE CLUB OR ORGANIZATION YOU BELONG TO?: NEVER
WITHIN THE LAST YEAR, HAVE YOU BEEN AFRAID OF YOUR PARTNER OR EX-PARTNER?: NO
WITHIN THE LAST YEAR, HAVE YOU BEEN KICKED, HIT, SLAPPED, OR OTHERWISE PHYSICALLY HURT BY YOUR PARTNER OR EX-PARTNER?: NO
IN A TYPICAL WEEK, HOW MANY TIMES DO YOU TALK ON THE PHONE WITH FAMILY, FRIENDS, OR NEIGHBORS?: MORE THAN THREE TIMES A WEEK
DO YOU BELONG TO ANY CLUBS OR ORGANIZATIONS SUCH AS CHURCH GROUPS UNIONS, FRATERNAL OR ATHLETIC GROUPS, OR SCHOOL GROUPS?: NO
HOW OFTEN DO YOU GET TOGETHER WITH FRIENDS OR RELATIVES?: ONCE A WEEK

## 2024-07-15 ASSESSMENT — PATIENT HEALTH QUESTIONNAIRE - PHQ9: DEPRESSION UNABLE TO ASSESS: FUNCTIONAL CAPACITY MOTIVATION LIMITS ACCURACY

## 2024-07-15 NOTE — PROGRESS NOTES
On the basis of positive falls risk screening, assessment and plan is as follows: home safety tips provided.to care giver Deanna NESBITT .        Subjective:     Prabhjot Hendricks is a 83 y.o. male who presents today with wife and friend with the following:  Chief Complaint   Patient presents with    \[facility]\"" Care    Memory Loss    Insomnia       Patient Active Problem List   Diagnosis    B12 deficiency    Mild late onset Alzheimer's dementia without behavioral disturbance, psychotic disturbance, mood disturbance, or anxiety (HCC)    Dementia in other diseases classified elsewhere, mild, with anxiety (HCC)         COMPLIANT WITH MEDICATION:    Denies chest pain, dyspnea, palpitations, headache and blurred vision. Blood pressure normotensive.    Insomnia; long term problem .  Wife endorses with his memory loss he doesn't remember that he has slept.  He takes Remeron nightly. He generally sleeps 5 to 6 hours a night and naps during the daytime    Parkinson's uses a cane for stability no falls reported.    Memory loss: 18 months followed by neurology and works well with Dr. Jess GOLDEN    depression screening addressed not at risk    abuse screening addressed denies    learning assessment addressed reviewed nurses notes    fall risk addressed not at risk    HM: addressed  declines immunizations today.    ROS:  Gen: denies fever, chills, fatigue, weight loss, weight gain  HEENT:denies blurry vision, nasal congestion, sore throat  Resp: denies dypsnea, cough, wheezing  CV: denies chest pain radiating to the jaws or arms, palpitations, lower extremity edema  Abd: denies nausea, vomiting, diarrhea, constipation  Neuro: denies numbness/tingling  Endo: denies polyuria, polydipsia, heat/cold intolerance  Heme: no lymphadenopathy    No Known Allergies      Current Outpatient Medications:     memantine (NAMENDA) 5 MG tablet, TAKE 1 TABLET BY MOUTH TWICE A DAY (Patient not taking: Reported on 7/15/2024), Disp: 180 tablet,

## 2024-07-15 NOTE — PROGRESS NOTES
\"Have you been to the ER, urgent care clinic since your last visit?  Hospitalized since your last visit?\"    no    “Have you seen or consulted any other health care providers outside of [facility] since your last visit?”    No              Click Here for Release of Records Request      Chief Complaint   Patient presents with    Establish Care    Memory Loss    Insomnia         Vitals:    07/15/24 1338   BP: 120/70   Pulse: 56   Temp: 97.7 °F (36.5 °C)   SpO2: 97%

## 2024-07-16 LAB
EST. AVERAGE GLUCOSE BLD GHB EST-MCNC: 111 MG/DL
HBA1C MFR BLD: 5.5 % (ref 4–5.6)

## 2024-07-18 PROBLEM — F02.A4: Status: ACTIVE | Noted: 2024-07-18

## 2024-07-18 PROBLEM — G20.A1 PARKINSON'S DISEASE (HCC): Status: ACTIVE | Noted: 2024-07-18

## 2024-07-18 RX ORDER — DONEPEZIL HYDROCHLORIDE 10 MG/1
10 TABLET, FILM COATED ORAL NIGHTLY
Qty: 90 TABLET | Refills: 1 | Status: SHIPPED | OUTPATIENT
Start: 2024-07-18

## 2024-07-18 RX ORDER — MEMANTINE HYDROCHLORIDE 5 MG/1
5 TABLET ORAL 2 TIMES DAILY
Qty: 180 TABLET | Refills: 1 | Status: SHIPPED | OUTPATIENT
Start: 2024-07-18

## 2024-07-18 RX ORDER — MIRTAZAPINE 15 MG/1
15 TABLET, FILM COATED ORAL NIGHTLY
Qty: 90 TABLET | Refills: 1 | Status: SHIPPED | OUTPATIENT
Start: 2024-07-18

## 2024-09-12 ENCOUNTER — OFFICE VISIT (OUTPATIENT)
Age: 84
End: 2024-09-12

## 2024-09-12 VITALS
TEMPERATURE: 97 F | WEIGHT: 156 LBS | SYSTOLIC BLOOD PRESSURE: 100 MMHG | BODY MASS INDEX: 20.67 KG/M2 | DIASTOLIC BLOOD PRESSURE: 60 MMHG | RESPIRATION RATE: 18 BRPM | HEART RATE: 92 BPM | HEIGHT: 73 IN | OXYGEN SATURATION: 99 %

## 2024-09-12 DIAGNOSIS — H66.002 NON-RECURRENT ACUTE SUPPURATIVE OTITIS MEDIA OF LEFT EAR WITHOUT SPONTANEOUS RUPTURE OF TYMPANIC MEMBRANE: ICD-10-CM

## 2024-09-12 DIAGNOSIS — F02.A0 MILD LATE ONSET ALZHEIMER'S DEMENTIA WITHOUT BEHAVIORAL DISTURBANCE, PSYCHOTIC DISTURBANCE, MOOD DISTURBANCE, OR ANXIETY (HCC): ICD-10-CM

## 2024-09-12 DIAGNOSIS — R51.9 ACUTE NONINTRACTABLE HEADACHE, UNSPECIFIED HEADACHE TYPE: ICD-10-CM

## 2024-09-12 DIAGNOSIS — H57.89 EYE IRRITATION: ICD-10-CM

## 2024-09-12 DIAGNOSIS — Z00.00 ENCOUNTER FOR MEDICARE ANNUAL WELLNESS EXAM: Primary | ICD-10-CM

## 2024-09-12 DIAGNOSIS — F51.01 PRIMARY INSOMNIA: ICD-10-CM

## 2024-09-12 DIAGNOSIS — R41.3 MEMORY LOSS: ICD-10-CM

## 2024-09-12 DIAGNOSIS — G30.1 MILD LATE ONSET ALZHEIMER'S DEMENTIA WITHOUT BEHAVIORAL DISTURBANCE, PSYCHOTIC DISTURBANCE, MOOD DISTURBANCE, OR ANXIETY (HCC): ICD-10-CM

## 2024-09-12 DIAGNOSIS — E78.2 MIXED HYPERLIPIDEMIA: ICD-10-CM

## 2024-09-12 RX ORDER — AMOXICILLIN AND CLAVULANATE POTASSIUM 500; 125 MG/1; MG/1
1 TABLET, FILM COATED ORAL 2 TIMES DAILY
Qty: 20 TABLET | Refills: 0 | Status: SHIPPED | OUTPATIENT
Start: 2024-09-12 | End: 2024-09-22

## 2024-09-12 SDOH — ECONOMIC STABILITY: FOOD INSECURITY: WITHIN THE PAST 12 MONTHS, YOU WORRIED THAT YOUR FOOD WOULD RUN OUT BEFORE YOU GOT MONEY TO BUY MORE.: NEVER TRUE

## 2024-09-12 SDOH — ECONOMIC STABILITY: FOOD INSECURITY: WITHIN THE PAST 12 MONTHS, THE FOOD YOU BOUGHT JUST DIDN'T LAST AND YOU DIDN'T HAVE MONEY TO GET MORE.: NEVER TRUE

## 2024-09-12 SDOH — ECONOMIC STABILITY: INCOME INSECURITY: HOW HARD IS IT FOR YOU TO PAY FOR THE VERY BASICS LIKE FOOD, HOUSING, MEDICAL CARE, AND HEATING?: NOT HARD AT ALL

## 2024-09-12 ASSESSMENT — LIFESTYLE VARIABLES
HOW OFTEN DO YOU HAVE A DRINK CONTAINING ALCOHOL: NEVER
HOW MANY STANDARD DRINKS CONTAINING ALCOHOL DO YOU HAVE ON A TYPICAL DAY: PATIENT DOES NOT DRINK

## 2024-09-12 ASSESSMENT — PATIENT HEALTH QUESTIONNAIRE - PHQ9
6. FEELING BAD ABOUT YOURSELF - OR THAT YOU ARE A FAILURE OR HAVE LET YOURSELF OR YOUR FAMILY DOWN: NOT AT ALL
SUM OF ALL RESPONSES TO PHQ QUESTIONS 1-9: 13
9. THOUGHTS THAT YOU WOULD BE BETTER OFF DEAD, OR OF HURTING YOURSELF: NOT AT ALL
1. LITTLE INTEREST OR PLEASURE IN DOING THINGS: NEARLY EVERY DAY
4. FEELING TIRED OR HAVING LITTLE ENERGY: NEARLY EVERY DAY
8. MOVING OR SPEAKING SO SLOWLY THAT OTHER PEOPLE COULD HAVE NOTICED. OR THE OPPOSITE, BEING SO FIGETY OR RESTLESS THAT YOU HAVE BEEN MOVING AROUND A LOT MORE THAN USUAL: NOT AT ALL
5. POOR APPETITE OR OVEREATING: SEVERAL DAYS
3. TROUBLE FALLING OR STAYING ASLEEP: NEARLY EVERY DAY
7. TROUBLE CONCENTRATING ON THINGS, SUCH AS READING THE NEWSPAPER OR WATCHING TELEVISION: NOT AT ALL
10. IF YOU CHECKED OFF ANY PROBLEMS, HOW DIFFICULT HAVE THESE PROBLEMS MADE IT FOR YOU TO DO YOUR WORK, TAKE CARE OF THINGS AT HOME, OR GET ALONG WITH OTHER PEOPLE: SOMEWHAT DIFFICULT
SUM OF ALL RESPONSES TO PHQ QUESTIONS 1-9: 13
SUM OF ALL RESPONSES TO PHQ9 QUESTIONS 1 & 2: 6
SUM OF ALL RESPONSES TO PHQ QUESTIONS 1-9: 13
SUM OF ALL RESPONSES TO PHQ QUESTIONS 1-9: 13
2. FEELING DOWN, DEPRESSED OR HOPELESS: NEARLY EVERY DAY

## 2024-09-13 LAB
CHOLEST SERPL-MCNC: 138 MG/DL
HDLC SERPL-MCNC: 65 MG/DL
HDLC SERPL: 2.1 (ref 0–5)
LDLC SERPL CALC-MCNC: 58.8 MG/DL (ref 0–100)
TRIGL SERPL-MCNC: 71 MG/DL
VLDLC SERPL CALC-MCNC: 14.2 MG/DL

## 2024-09-14 RX ORDER — MIRTAZAPINE 15 MG/1
15 TABLET, FILM COATED ORAL NIGHTLY
Qty: 90 TABLET | Refills: 1
Start: 2024-09-14

## 2024-09-14 RX ORDER — MEMANTINE HYDROCHLORIDE 5 MG/1
5 TABLET ORAL 2 TIMES DAILY
Qty: 180 TABLET | Refills: 1
Start: 2024-09-14

## 2024-09-14 RX ORDER — DONEPEZIL HYDROCHLORIDE 10 MG/1
10 TABLET, FILM COATED ORAL NIGHTLY
Qty: 90 TABLET | Refills: 1
Start: 2024-09-14

## 2024-10-01 ENCOUNTER — TELEPHONE (OUTPATIENT)
Age: 84
End: 2024-10-01

## 2024-10-01 NOTE — TELEPHONE ENCOUNTER
ML with son Sterling Hendricks, needs PPD test for nursing home placement . Will need a NV appointment and then f/u to have read in 48-72 hours

## 2024-10-07 ENCOUNTER — NURSE ONLY (OUTPATIENT)
Age: 84
End: 2024-10-07
Payer: MEDICARE

## 2024-10-07 DIAGNOSIS — Z11.1 TUBERCULOSIS SCREENING: Primary | ICD-10-CM

## 2024-10-07 PROCEDURE — 86580 TB INTRADERMAL TEST: CPT

## 2024-10-09 LAB
INDURATION: NORMAL
TB SKIN TEST: 0

## 2024-10-09 NOTE — PROGRESS NOTES
Pt returned to the office for his PPD reading, Negative, confirmed via Nubia Cason NP for questionable spot.  Pt was given application forms and copies were made to be faxed.

## (undated) DEVICE — BETADINE 5% EYE SOL

## (undated) DEVICE — TAPE ADH W1INXL10YD CLTH SILK H2O RESIST CURAD

## (undated) DEVICE — GLOVE ORANGE PI 8   MSG9080

## (undated) DEVICE — TIPLESS W/ 0.9 MM HIGH INFUSION SLEEVES: Brand: ALCON, INFINITI, MICROSMOOTH

## (undated) DEVICE — B-H IRRIGATING CAN 19GA FLAT ANGLED 8MM: Brand: OPHTHALMIC CANNULA

## (undated) DEVICE — LABEL STERILIZATION W/PEN -- 50/CA

## (undated) DEVICE — SOL IRR SOD CL 0.9% 250ML BTL --

## (undated) DEVICE — 45° KELMAN®, 0.9 MM TURBOSONICS® MINI-FLARED ABS® TIP: Brand: ALCON, KELMAN, TURBOSONICS, MINI-FLARED ABS

## (undated) DEVICE — MICROSURGICAL INSTRUMENT CAPSULE POLISHER-37 BEND, 21GA W .3MM SIDE PORT: Brand: ALCON

## (undated) DEVICE — SOLUTION IRRIG 250ML STRL H2O PLAS POUR BTL USP

## (undated) DEVICE — KIT PT CARE CATRCT POSTOP CUST

## (undated) DEVICE — MICROSURGICAL INSTRUMENT IRR. CYSTITOME 25GA FORMED-REVERSE CUTTING: Brand: ALCON

## (undated) DEVICE — Device: Brand: MALYUGIN RING SYSTEM 7.0MM

## (undated) DEVICE — Device

## (undated) DEVICE — TAPE ADH CLTH SILK H2O REPELLENT CURAD

## (undated) DEVICE — OPHTHALMIC KNIFE 15°: Brand: ALCON

## (undated) DEVICE — MICROSURGICAL INSTRUMENT ANTERIOR CHAMBER CANNULA 30GA: Brand: ALCON